# Patient Record
Sex: FEMALE | Race: WHITE | NOT HISPANIC OR LATINO | Employment: OTHER | URBAN - METROPOLITAN AREA
[De-identification: names, ages, dates, MRNs, and addresses within clinical notes are randomized per-mention and may not be internally consistent; named-entity substitution may affect disease eponyms.]

---

## 2017-03-28 ENCOUNTER — HOSPITAL ENCOUNTER (OUTPATIENT)
Dept: FAMILY MEDICINE CLINIC | Facility: CLINIC | Age: 62
Setting detail: SPECIMEN
Discharge: HOME | End: 2017-03-28
Attending: FAMILY MEDICINE | Admitting: FAMILY MEDICINE

## 2017-03-28 LAB
BACTERIA SPEC AEROBE CULT: NORMAL
Lab: NORMAL
MICRO REPORT STATUS: NORMAL
SPECIMEN SOURCE: NORMAL

## 2017-09-20 ENCOUNTER — HOSPITAL ENCOUNTER (OUTPATIENT)
Dept: FAMILY MEDICINE CLINIC | Facility: CLINIC | Age: 62
Setting detail: SPECIMEN
Discharge: HOME | End: 2017-09-20
Attending: FAMILY MEDICINE | Admitting: FAMILY MEDICINE

## 2017-09-20 LAB
ALBUMIN SERPL-MCNC: 3.6 G/DL (ref 3.5–4.8)
ALBUMIN/GLOB SERPL: 1 {RATIO} (ref 1–1.7)
ALP SERPL-CCNC: 81 IU/L (ref 32–91)
ALT SERPL-CCNC: 14 IU/L (ref 14–54)
ANION GAP SERPL CALC-SCNC: 12.4 MMOL/L (ref 10–20)
AST SERPL-CCNC: 19 IU/L (ref 15–41)
BILIRUB SERPL-MCNC: 0.6 MG/DL (ref 0.3–1.2)
BUN SERPL-MCNC: 22 MG/DL (ref 8–20)
BUN/CREAT SERPL: 18.3 (ref 5.4–26.2)
CALCIUM SERPL-MCNC: 9.5 MG/DL (ref 8.9–10.3)
CHLORIDE SERPL-SCNC: 103 MMOL/L (ref 101–111)
CHOLEST SERPL-MCNC: 159 MG/DL
CHOLEST/HDLC SERPL: 4 {RATIO}
CONV CO2: 27 MMOL/L (ref 22–32)
CONV LDL CHOLESTEROL DIRECT: 87 MG/DL (ref 0–100)
CONV MICROALBUM.,U,RANDOM: 19 MG/L
CONV TOTAL PROTEIN: 7.3 G/DL (ref 6.1–7.9)
CREAT UR-MCNC: 1.2 MG/DL (ref 0.4–1)
GLOBULIN UR ELPH-MCNC: 3.7 G/DL (ref 2.5–3.8)
GLUCOSE SERPL-MCNC: 154 MG/DL (ref 65–99)
HDLC SERPL-MCNC: 39 MG/DL
LDLC/HDLC SERPL: 2.2 {RATIO}
LIPID INTERPRETATION: ABNORMAL
POTASSIUM SERPL-SCNC: 5.4 MMOL/L (ref 3.6–5.1)
SODIUM SERPL-SCNC: 137 MMOL/L (ref 136–144)
TRIGL SERPL-MCNC: 268 MG/DL
VLDLC SERPL CALC-MCNC: 32.6 MG/DL

## 2017-10-09 ENCOUNTER — HOSPITAL ENCOUNTER (OUTPATIENT)
Dept: FAMILY MEDICINE CLINIC | Facility: CLINIC | Age: 62
Setting detail: SPECIMEN
Discharge: HOME | End: 2017-10-09
Attending: INTERNAL MEDICINE | Admitting: INTERNAL MEDICINE

## 2017-10-09 LAB
ALBUMIN SERPL-MCNC: 3.6 G/DL (ref 3.5–4.8)
ALBUMIN/GLOB SERPL: 1 {RATIO} (ref 1–1.7)
ALP SERPL-CCNC: 70 IU/L (ref 32–91)
ALT SERPL-CCNC: 16 IU/L (ref 14–54)
ANION GAP SERPL CALC-SCNC: 13.4 MMOL/L (ref 10–20)
AST SERPL-CCNC: 24 IU/L (ref 15–41)
BILIRUB SERPL-MCNC: 0.6 MG/DL (ref 0.3–1.2)
BUN SERPL-MCNC: 19 MG/DL (ref 8–20)
BUN/CREAT SERPL: 17.3 (ref 5.4–26.2)
CALCIUM SERPL-MCNC: 9.4 MG/DL (ref 8.9–10.3)
CHLORIDE SERPL-SCNC: 105 MMOL/L (ref 101–111)
CONV CO2: 24 MMOL/L (ref 22–32)
CONV TOTAL PROTEIN: 7.3 G/DL (ref 6.1–7.9)
CREAT UR-MCNC: 1.1 MG/DL (ref 0.4–1)
GLOBULIN UR ELPH-MCNC: 3.7 G/DL (ref 2.5–3.8)
GLUCOSE SERPL-MCNC: 227 MG/DL (ref 65–99)
POTASSIUM SERPL-SCNC: 4.4 MMOL/L (ref 3.6–5.1)
SODIUM SERPL-SCNC: 138 MMOL/L (ref 136–144)

## 2017-11-18 ENCOUNTER — HOSPITAL ENCOUNTER (OUTPATIENT)
Dept: URGENT CARE | Facility: CLINIC | Age: 62
Setting detail: SPECIMEN
Discharge: HOME | End: 2017-11-18
Attending: FAMILY MEDICINE | Admitting: FAMILY MEDICINE

## 2017-11-18 LAB
BACTERIA ISLT: NORMAL
BACTERIA SPEC AEROBE CULT: NORMAL
CLINDAMYCIN SUSC ISLT: NORMAL
ERYTHROMYCIN SUSC ISLT: NORMAL
GRAM STN SPEC: NORMAL
Lab: NORMAL
MICRO REPORT STATUS: NORMAL
OXACILLIN SUSC ISLT: NORMAL
SPECIMEN SOURCE: NORMAL
SUSC METH SPEC: NORMAL
TETRACYCLINE SUSC ISLT: NORMAL
TRIMETHOPRIM/SULFA: NORMAL
VANCOMYCIN SUSC ISLT: NORMAL

## 2019-02-13 ENCOUNTER — HOSPITAL ENCOUNTER (OUTPATIENT)
Dept: FAMILY MEDICINE CLINIC | Facility: CLINIC | Age: 64
Setting detail: SPECIMEN
Discharge: HOME | End: 2019-02-13
Attending: FAMILY MEDICINE | Admitting: FAMILY MEDICINE

## 2019-02-13 LAB
ALBUMIN SERPL-MCNC: 3.7 G/DL (ref 3.5–4.8)
ALBUMIN/GLOB SERPL: 0.9 {RATIO} (ref 1–1.7)
ALP SERPL-CCNC: 79 IU/L (ref 32–91)
ALT SERPL-CCNC: 17 IU/L (ref 14–54)
ANION GAP SERPL CALC-SCNC: 16.8 MMOL/L (ref 10–20)
AST SERPL-CCNC: 24 IU/L (ref 15–41)
BASOPHILS # BLD AUTO: 0.1 10*3/UL (ref 0–0.2)
BASOPHILS NFR BLD AUTO: 1 % (ref 0–2)
BILIRUB SERPL-MCNC: 0.6 MG/DL (ref 0.3–1.2)
BUN SERPL-MCNC: 16 MG/DL (ref 8–20)
BUN/CREAT SERPL: 16 (ref 5.4–26.2)
CALCIUM SERPL-MCNC: 9.7 MG/DL (ref 8.9–10.3)
CHLORIDE SERPL-SCNC: 101 MMOL/L (ref 101–111)
CHOLEST SERPL-MCNC: 156 MG/DL
CHOLEST/HDLC SERPL: 3.6 {RATIO}
CONV CO2: 24 MMOL/L (ref 22–32)
CONV LDL CHOLESTEROL DIRECT: 91 MG/DL (ref 0–100)
CONV TOTAL PROTEIN: 7.9 G/DL (ref 6.1–7.9)
CREAT UR-MCNC: 1 MG/DL (ref 0.4–1)
DIFFERENTIAL METHOD BLD: (no result)
EOSINOPHIL # BLD AUTO: 0.2 10*3/UL (ref 0–0.3)
EOSINOPHIL # BLD AUTO: 2 % (ref 0–3)
ERYTHROCYTE [DISTWIDTH] IN BLOOD BY AUTOMATED COUNT: 14.8 % (ref 11.5–14.5)
GLOBULIN UR ELPH-MCNC: 4.2 G/DL (ref 2.5–3.8)
GLUCOSE SERPL-MCNC: 126 MG/DL (ref 65–99)
HCT VFR BLD AUTO: 39.8 % (ref 35–49)
HDLC SERPL-MCNC: 44 MG/DL
HGB BLD-MCNC: 13.1 G/DL (ref 12–15)
LDLC/HDLC SERPL: 2.1 {RATIO}
LIPID INTERPRETATION: ABNORMAL
LYMPHOCYTES # BLD AUTO: 3.9 10*3/UL (ref 0.8–4.8)
LYMPHOCYTES NFR BLD AUTO: 33 % (ref 18–42)
MCH RBC QN AUTO: 28.7 PG (ref 26–32)
MCHC RBC AUTO-ENTMCNC: 32.9 G/DL (ref 32–36)
MCV RBC AUTO: 87.2 FL (ref 80–94)
MONOCYTES # BLD AUTO: 0.8 10*3/UL (ref 0.1–1.3)
MONOCYTES NFR BLD AUTO: 7 % (ref 2–11)
NEUTROPHILS # BLD AUTO: 6.7 10*3/UL (ref 2.3–8.6)
NEUTROPHILS NFR BLD AUTO: 57 % (ref 50–75)
NRBC BLD AUTO-RTO: 0 /100{WBCS}
NRBC/RBC NFR BLD MANUAL: 0 10*3/UL
PLATELET # BLD AUTO: 399 10*3/UL (ref 150–450)
PMV BLD AUTO: 8.1 FL (ref 7.4–10.4)
POTASSIUM SERPL-SCNC: 4.8 MMOL/L (ref 3.6–5.1)
RBC # BLD AUTO: 4.57 10*6/UL (ref 4–5.4)
SODIUM SERPL-SCNC: 137 MMOL/L (ref 136–144)
TRIGL SERPL-MCNC: 242 MG/DL
VLDLC SERPL CALC-MCNC: 21.8 MG/DL
WBC # BLD AUTO: 11.8 10*3/UL (ref 4.5–11.5)

## 2019-02-21 ENCOUNTER — HOSPITAL ENCOUNTER (OUTPATIENT)
Dept: FAMILY MEDICINE CLINIC | Facility: CLINIC | Age: 64
Setting detail: SPECIMEN
Discharge: HOME | End: 2019-02-21
Attending: FAMILY MEDICINE | Admitting: FAMILY MEDICINE

## 2019-09-16 ENCOUNTER — OFFICE VISIT (OUTPATIENT)
Dept: FAMILY MEDICINE CLINIC | Facility: CLINIC | Age: 64
End: 2019-09-16

## 2019-09-16 VITALS
BODY MASS INDEX: 33.49 KG/M2 | HEART RATE: 81 BPM | HEIGHT: 63 IN | SYSTOLIC BLOOD PRESSURE: 125 MMHG | DIASTOLIC BLOOD PRESSURE: 75 MMHG | TEMPERATURE: 98.6 F | WEIGHT: 189 LBS | OXYGEN SATURATION: 96 %

## 2019-09-16 DIAGNOSIS — R30.0 DYSURIA: Primary | ICD-10-CM

## 2019-09-16 DIAGNOSIS — R35.0 FREQUENCY OF URINATION: ICD-10-CM

## 2019-09-16 PROBLEM — E78.5 HYPERLIPIDEMIA: Status: ACTIVE | Noted: 2019-09-16

## 2019-09-16 PROBLEM — K21.9 GASTROESOPHAGEAL REFLUX DISEASE: Status: ACTIVE | Noted: 2019-09-16

## 2019-09-16 PROBLEM — I25.10 CORONARY ARTERY DISEASE: Status: ACTIVE | Noted: 2019-09-16

## 2019-09-16 PROBLEM — I10 HYPERTENSION: Status: ACTIVE | Noted: 2019-09-16

## 2019-09-16 PROBLEM — E87.5 HYPERKALEMIA: Status: ACTIVE | Noted: 2017-09-22

## 2019-09-16 PROBLEM — Z80.0 FAMILY HISTORY OF MALIGNANT NEOPLASM OF COLON: Status: ACTIVE | Noted: 2019-09-16

## 2019-09-16 LAB
BILIRUB BLD-MCNC: NEGATIVE MG/DL
CLARITY, POC: CLEAR
COLOR UR: YELLOW
GLUCOSE UR STRIP-MCNC: NEGATIVE MG/DL
KETONES UR QL: NEGATIVE
LEUKOCYTE EST, POC: NEGATIVE
NITRITE UR-MCNC: NEGATIVE MG/ML
PH UR: 6.5 [PH] (ref 5–8)
PROT UR STRIP-MCNC: ABNORMAL MG/DL
RBC # UR STRIP: NEGATIVE /UL
SP GR UR: 1.02 (ref 1–1.03)
UROBILINOGEN UR QL: NORMAL

## 2019-09-16 PROCEDURE — 99213 OFFICE O/P EST LOW 20 MIN: CPT | Performed by: FAMILY MEDICINE

## 2019-09-16 PROCEDURE — 81003 URINALYSIS AUTO W/O SCOPE: CPT | Performed by: FAMILY MEDICINE

## 2019-09-16 RX ORDER — CLOBETASOL PROPIONATE 0.5 MG/G
OINTMENT TOPICAL
Refills: 3 | COMMUNITY
Start: 2019-08-01 | End: 2021-11-09

## 2019-09-16 RX ORDER — SULFAMETHOXAZOLE AND TRIMETHOPRIM 800; 160 MG/1; MG/1
1 TABLET ORAL 2 TIMES DAILY
Qty: 14 TABLET | Refills: 0 | Status: SHIPPED | OUTPATIENT
Start: 2019-09-16 | End: 2020-02-10

## 2019-09-16 NOTE — PATIENT INSTRUCTIONS
Urinary Frequency, Adult    Urinary frequency means urinating more often than usual. People with urinary frequency urinate at least 8 times in 24 hours, even if they drink a normal amount of fluid. Although they urinate more often than normal, the total amount of urine produced in a day may be normal. Urinary frequency is also called pollakiuria.  What are the causes?  This condition may be caused by:  · A urinary tract infection.  · Obesity.  · Bladder problems, such as bladder stones.  · Caffeine or alcohol.  · Eating food or drinking fluids that irritate the bladder. These include coffee, tea, soda, artificial sweeteners, citrus, tomato-based foods, and chocolate.  · Certain medicines, such as medicines that help the body get rid of extra fluid (diuretics).  · Muscle or nerve weakness.  · Overactive bladder.  · Chronic diabetes.  · Interstitial cystitis.  · In men, problems with the prostate, such as an enlarged prostate.  · In women, pregnancy.  In some cases, the cause may not be known.  What increases the risk?  This condition is more likely to develop in:  · Women who have gone through menopause.  · Men with prostate problems.  · People with a disease or injury that affects the nerves or spinal cord.  · People who have or have had a condition that affects the brain, such as a stroke.  What are the signs or symptoms?  Symptoms of this condition include:  · Feeling an urgent need to urinate often. The stress and anxiety of needing to find a bathroom quickly can make this urge worse.  · Urinating 8 or more times in 24 hours.  · Urinating as often as every 1 to 2 hours.  How is this diagnosed?  This condition is diagnosed based on your symptoms, your medical history, and a physical exam. You may have tests, such as:  · Blood tests.  · Urine tests.  · Imaging tests, such as X-rays or ultrasounds.  · A bladder test.  · A test of your neurological system. This is the body system that senses the need to urinate.  · A  test to check for problems in the urethra and bladder called cystoscopy.  You may also be asked to keep a bladder diary. A bladder diary is a record of what you eat and drink, how often you urinate, and how much you urinate. You may need to see a health care provider who specializes in conditions of the urinary tract (urologist) or kidneys (nephrologist).  How is this treated?  Treatment for this condition depends on the cause. Sometimes the condition goes away on its own and treatment is not necessary. If treatment is needed, it may include:  · Taking medicine.  · Learning exercises that strengthen the muscles that help control urination.  · Following a bladder training program. This may include:  ? Learning to delay going to the bathroom.  ? Double urinating (voiding). This helps if you are not completely emptying your bladder.  ? Scheduled voiding.  · Making diet changes, such as:  ? Avoiding caffeine.  ? Drinking fewer fluids, especially alcohol.  ? Not drinking in the evening.  ? Not having foods or drinks that may irritate the bladder.  ? Eating foods that help prevent or ease constipation. Constipation can make this condition worse.  · Having the nerves in your bladder stimulated. There are two options for stimulating the nerves to your bladder:  ? Outpatient electrical nerve stimulation. This is done by your health care provider.  ? Surgery to implant a bladder pacemaker. The pacemaker helps to control the urge to urinate.  Follow these instructions at home:  · Keep a bladder diary if told to by your health care provider.  · Take over-the-counter and prescription medicines only as told by your health care provider.  · Do any exercises as told by your health care provider.  · Follow a bladder training program as told by your health care provider.  · Make any recommended diet changes.  · Keep all follow-up visits as told by your health care provider. This is important.  Contact a health care provider  if:  · You start urinating more often.  · You feel pain or irritation when you urinate.  · You notice blood in your urine.  · Your urine looks cloudy.  · You develop a fever.  · You begin vomiting.  Get help right away if:  · You are unable to urinate.  This information is not intended to replace advice given to you by your health care provider. Make sure you discuss any questions you have with your health care provider.  Document Released: 10/14/2010 Document Revised: 01/18/2017 Document Reviewed: 07/13/2016  [employer] Interactive Patient Education © 2019 [employer]

## 2019-09-16 NOTE — PROGRESS NOTES
Subjective   Chief Complaint   Patient presents with   • Urinary Urgency   • Urinary Frequency   • Dysuria     Tania Blunt is a 64 y.o. female.     Urinary Frequency    This is a new problem. The current episode started yesterday. The problem occurs every urination. The problem has been gradually worsening. The pain is moderate. There has been no fever. Associated symptoms include frequency and hesitancy. Pertinent negatives include no chills, hematuria, nausea or vomiting. She has tried increased fluids for the symptoms. The treatment provided mild relief.   Dysuria    Associated symptoms include frequency and hesitancy. Pertinent negatives include no chills, hematuria, nausea or vomiting.      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   • TUBAL ABDOMINAL LIGATION Bilateral      Allergies no known allergies  Social History     Socioeconomic History   • Marital status:      Spouse name: Not on file   • Number of children: Not on file   • Years of education: Not on file   • Highest education level: Not on file   Tobacco Use   • Smoking status: Current Every Day Smoker     Packs/day: 1.00     Years: 50.00     Pack years: 50.00     Types: Cigarettes   • Smokeless tobacco: Never Used   Substance and Sexual Activity   • Alcohol use: No     Frequency: Never     Comment: drinks caffeine free sodas   • Sexual activity: Defer     Social History     Tobacco Use   Smoking Status Current Every Day Smoker   • Packs/day: 1.00   • Years: 50.00   • Pack years: 50.00   • Types: Cigarettes   Smokeless Tobacco Never Used       family history includes Colon cancer in her  mother; Heart disease in her maternal grandmother; Hypertension in her mother; Lung cancer in her father.  Current Outpatient Medications on File Prior to Visit   Medication Sig Dispense Refill   • clobetasol (TEMOVATE) 0.05 % ointment APPLY TO HANDS AND FEET QD  3   • aspirin 81 MG tablet Take 1 tablet by mouth 2 (Two) Times a Day.     • Blood Glucose Monitoring Suppl (ONE TOUCH ULTRA 2) w/Device kit ONETOUCH ULTRA 2 w/Device KIT     • dexlansoprazole (DEXILANT) 60 MG capsule Take 1 capsule by mouth Daily.     • gabapentin (NEURONTIN) 400 MG capsule Take 1 capsule by mouth 3 (Three) Times a Day.     • glucose blood (ONE TOUCH ULTRA TEST) test strip ONETOUCH ULTRA BLUE STRP     • glyBURIDE (DIAbeta) 5 MG tablet Take 1 tablet by mouth 2 (Two) Times a Day.     • hydrALAZINE (APRESOLINE) 25 MG tablet Take 1 tablet by mouth Every 12 (Twelve) Hours.     • HYDROcodone-acetaminophen (NORCO) 7.5-325 MG per tablet Take 1 tablet by mouth Every 6 (Six) Hours As Needed.     • isosorbide mononitrate (IMDUR) 60 MG 24 hr tablet Take 1 tablet by mouth Daily.     • Lancets Thin misc LANCETS THIN     • metoprolol tartrate (LOPRESSOR) 25 MG tablet Take 1 tablet by mouth Every 12 (Twelve) Hours.     • simvastatin (ZOCOR) 40 MG tablet Take 1 tablet by mouth every night at bedtime.     • SITagliptin-metFORMIN HCl ER (JANUMET XR)  MG tablet Take 1 tablet by mouth 2 (Two) Times a Day.     • [DISCONTINUED] Semaglutide (OZEMPIC) 0.25 or 0.5 MG/DOSE solution pen-injector OZEMPIC 0.25 or 0.5 MG/DOSE SOPN       No current facility-administered medications on file prior to visit.      Patient Active Problem List   Diagnosis   • Dysuria   • Frequency of urination       The following portions of the patient's history were reviewed and updated as appropriate: allergies, current medications, past family history, past medical history, past social history, past surgical history and problem list.    Review of Systems   Constitutional: Negative  "for chills and fever.   HENT: Negative for sinus pressure and sore throat.    Eyes: Negative for blurred vision.   Respiratory: Negative for cough and shortness of breath.    Cardiovascular: Negative for chest pain and palpitations.   Gastrointestinal: Negative for abdominal pain, nausea and vomiting.   Endocrine: Negative for polyuria.   Genitourinary: Positive for dysuria, frequency and hesitancy. Negative for hematuria.   Skin: Negative for rash.   Neurological: Negative for dizziness and headache.   Hematological: Negative for adenopathy.   Psychiatric/Behavioral: Negative for depressed mood.       Objective   /75 (BP Location: Left arm, Patient Position: Sitting, Cuff Size: Adult)   Pulse 81   Temp 98.6 °F (37 °C) (Oral)   Ht 160.7 cm (63.25\")   Wt 85.7 kg (189 lb)   SpO2 96%   BMI 33.22 kg/m²   Physical Exam    Office Visit on 09/16/2019   Component Date Value Ref Range Status   • Color 09/16/2019 Yellow  Yellow, Straw, Dark Yellow, Cathy Final   • Clarity, UA 09/16/2019 Clear  Clear Final   • Specific Gravity  09/16/2019 1.025  1.005 - 1.030 Final   • pH, Urine 09/16/2019 6.5  5.0 - 8.0 Final   • Leukocytes 09/16/2019 Negative  Negative Final   • Nitrite, UA 09/16/2019 Negative  Negative Final   • Protein, POC 09/16/2019 30 mg/dL* Negative mg/dL Final   • Glucose, UA 09/16/2019 Negative  Negative, 1000 mg/dL (3+) mg/dL Final   • Ketones, UA 09/16/2019 Negative  Negative Final   • Urobilinogen, UA 09/16/2019 Normal  Normal Final   • Bilirubin 09/16/2019 Negative  Negative Final   • Blood, UA 09/16/2019 Negative  Negative Final           Assessment/Plan       Tania was seen today for urinary urgency, urinary frequency and dysuria.    Diagnoses and all orders for this visit:    Dysuria  -     POCT urinalysis dipstick, automated    Frequency of urination  -     POCT urinalysis dipstick, automated           "

## 2019-09-30 RX ORDER — GABAPENTIN 400 MG/1
CAPSULE ORAL
Qty: 270 CAPSULE | Refills: 1 | Status: SHIPPED | OUTPATIENT
Start: 2019-09-30 | End: 2020-03-27

## 2019-09-30 RX ORDER — DEXLANSOPRAZOLE 60 MG/1
CAPSULE, DELAYED RELEASE ORAL
Qty: 30 CAPSULE | Refills: 5 | Status: SHIPPED | OUTPATIENT
Start: 2019-09-30 | End: 2020-03-25

## 2019-10-04 RX ORDER — SITAGLIPTIN AND METFORMIN HYDROCHLORIDE 1000; 50 MG/1; MG/1
TABLET, FILM COATED, EXTENDED RELEASE ORAL
Qty: 180 TABLET | Refills: 3 | Status: SHIPPED | OUTPATIENT
Start: 2019-10-04 | End: 2020-10-22 | Stop reason: SDUPTHER

## 2019-10-14 ENCOUNTER — TELEPHONE (OUTPATIENT)
Dept: FAMILY MEDICINE CLINIC | Facility: CLINIC | Age: 64
End: 2019-10-14

## 2019-10-14 NOTE — TELEPHONE ENCOUNTER
Pt called asking for a 90 days rx of Glyburide 5 mg BID. Her last rx is from 8/2017 because she does not take them as prescribed.

## 2019-10-16 RX ORDER — GLYBURIDE 5 MG/1
5 TABLET ORAL 2 TIMES DAILY WITH MEALS
Qty: 180 TABLET | Refills: 3 | Status: SHIPPED | OUTPATIENT
Start: 2019-10-16 | End: 2019-10-17

## 2019-10-17 RX ORDER — GLIMEPIRIDE 2 MG/1
2 TABLET ORAL
Qty: 90 TABLET | Refills: 1 | Status: SHIPPED | OUTPATIENT
Start: 2019-10-17 | End: 2020-04-24

## 2019-10-21 RX ORDER — HYDRALAZINE HYDROCHLORIDE 25 MG/1
TABLET, FILM COATED ORAL
Qty: 180 TABLET | Refills: 1 | Status: SHIPPED | OUTPATIENT
Start: 2019-10-21 | End: 2020-04-24

## 2019-10-28 ENCOUNTER — OFFICE VISIT (OUTPATIENT)
Dept: FAMILY MEDICINE CLINIC | Facility: CLINIC | Age: 64
End: 2019-10-28

## 2019-10-28 VITALS
TEMPERATURE: 98.1 F | HEART RATE: 83 BPM | RESPIRATION RATE: 16 BRPM | OXYGEN SATURATION: 95 % | SYSTOLIC BLOOD PRESSURE: 142 MMHG | WEIGHT: 186 LBS | HEIGHT: 63 IN | BODY MASS INDEX: 32.96 KG/M2 | DIASTOLIC BLOOD PRESSURE: 58 MMHG

## 2019-10-28 DIAGNOSIS — I10 ESSENTIAL HYPERTENSION: ICD-10-CM

## 2019-10-28 DIAGNOSIS — E11.9 TYPE 2 DIABETES MELLITUS WITHOUT COMPLICATION, WITHOUT LONG-TERM CURRENT USE OF INSULIN (HCC): ICD-10-CM

## 2019-10-28 DIAGNOSIS — L40.9 PSORIASIS: Primary | ICD-10-CM

## 2019-10-28 DIAGNOSIS — Z79.899 HIGH RISK MEDICATION USE: ICD-10-CM

## 2019-10-28 PROCEDURE — 99214 OFFICE O/P EST MOD 30 MIN: CPT | Performed by: FAMILY MEDICINE

## 2019-10-31 LAB
ALBUMIN SERPL-MCNC: 4.2 G/DL (ref 3.5–5.2)
ALBUMIN/GLOB SERPL: 1.2 G/DL
ALP SERPL-CCNC: 76 U/L (ref 39–117)
ALT SERPL W P-5'-P-CCNC: 11 U/L (ref 1–33)
ANION GAP SERPL CALCULATED.3IONS-SCNC: 12.4 MMOL/L (ref 5–15)
AST SERPL-CCNC: 16 U/L (ref 1–32)
BILIRUB SERPL-MCNC: 0.4 MG/DL (ref 0.2–1.2)
BUN BLD-MCNC: 13 MG/DL (ref 8–23)
BUN/CREAT SERPL: 15.9 (ref 7–25)
CALCIUM SPEC-SCNC: 9.4 MG/DL (ref 8.6–10.5)
CHLORIDE SERPL-SCNC: 97 MMOL/L (ref 98–107)
CHOLEST SERPL-MCNC: 136 MG/DL (ref 0–200)
CO2 SERPL-SCNC: 26.6 MMOL/L (ref 22–29)
CREAT BLD-MCNC: 0.82 MG/DL (ref 0.57–1)
GFR SERPL CREATININE-BSD FRML MDRD: 70 ML/MIN/1.73
GLOBULIN UR ELPH-MCNC: 3.6 GM/DL
GLUCOSE BLD-MCNC: 162 MG/DL (ref 65–99)
HBA1C MFR BLD: 6.9 % (ref 3.5–5.6)
HDLC SERPL-MCNC: 39 MG/DL (ref 40–60)
LDLC SERPL CALC-MCNC: 50 MG/DL (ref 0–100)
LDLC/HDLC SERPL: 1.28 {RATIO}
POTASSIUM BLD-SCNC: 4.5 MMOL/L (ref 3.5–5.2)
PROT SERPL-MCNC: 7.8 G/DL (ref 6–8.5)
SODIUM BLD-SCNC: 136 MMOL/L (ref 136–145)
TRIGL SERPL-MCNC: 236 MG/DL (ref 0–150)
VLDLC SERPL-MCNC: 47.2 MG/DL (ref 5–40)

## 2019-10-31 PROCEDURE — 80061 LIPID PANEL: CPT | Performed by: FAMILY MEDICINE

## 2019-10-31 PROCEDURE — 83036 HEMOGLOBIN GLYCOSYLATED A1C: CPT | Performed by: FAMILY MEDICINE

## 2019-10-31 PROCEDURE — 80053 COMPREHEN METABOLIC PANEL: CPT | Performed by: FAMILY MEDICINE

## 2019-10-31 PROCEDURE — 36415 COLL VENOUS BLD VENIPUNCTURE: CPT | Performed by: FAMILY MEDICINE

## 2020-02-10 ENCOUNTER — LAB (OUTPATIENT)
Dept: FAMILY MEDICINE CLINIC | Facility: CLINIC | Age: 65
End: 2020-02-10

## 2020-02-10 ENCOUNTER — OFFICE VISIT (OUTPATIENT)
Dept: FAMILY MEDICINE CLINIC | Facility: CLINIC | Age: 65
End: 2020-02-10

## 2020-02-10 VITALS
OXYGEN SATURATION: 95 % | WEIGHT: 181 LBS | DIASTOLIC BLOOD PRESSURE: 69 MMHG | BODY MASS INDEX: 31.81 KG/M2 | HEART RATE: 81 BPM | SYSTOLIC BLOOD PRESSURE: 147 MMHG | TEMPERATURE: 98.9 F

## 2020-02-10 DIAGNOSIS — Z12.11 SCREEN FOR COLON CANCER: ICD-10-CM

## 2020-02-10 DIAGNOSIS — M18.12 DEGENERATIVE ARTHRITIS OF THUMB, LEFT: Primary | ICD-10-CM

## 2020-02-10 DIAGNOSIS — Z12.31 ENCOUNTER FOR SCREENING MAMMOGRAM FOR BREAST CANCER: ICD-10-CM

## 2020-02-10 DIAGNOSIS — H10.31 ACUTE BACTERIAL CONJUNCTIVITIS OF RIGHT EYE: ICD-10-CM

## 2020-02-10 DIAGNOSIS — M18.12 DEGENERATIVE ARTHRITIS OF THUMB, LEFT: ICD-10-CM

## 2020-02-10 LAB — CHROMATIN AB SERPL-ACNC: <10 IU/ML (ref 0–14)

## 2020-02-10 PROCEDURE — 86038 ANTINUCLEAR ANTIBODIES: CPT | Performed by: FAMILY MEDICINE

## 2020-02-10 PROCEDURE — 36415 COLL VENOUS BLD VENIPUNCTURE: CPT

## 2020-02-10 PROCEDURE — 99214 OFFICE O/P EST MOD 30 MIN: CPT | Performed by: FAMILY MEDICINE

## 2020-02-10 PROCEDURE — 86431 RHEUMATOID FACTOR QUANT: CPT | Performed by: FAMILY MEDICINE

## 2020-02-10 RX ORDER — TOBRAMYCIN 3 MG/ML
1 SOLUTION/ DROPS OPHTHALMIC
Qty: 1 BOTTLE | Refills: 1 | Status: SHIPPED | OUTPATIENT
Start: 2020-02-10 | End: 2020-05-18

## 2020-02-10 RX ORDER — APREMILAST 30 MG/1
30 TABLET, FILM COATED ORAL DAILY
COMMUNITY
Start: 2020-01-22

## 2020-02-10 RX ORDER — HYDROCODONE BITARTRATE AND ACETAMINOPHEN 7.5; 325 MG/1; MG/1
1 TABLET ORAL EVERY 6 HOURS PRN
Qty: 28 TABLET | Refills: 0 | Status: SHIPPED | OUTPATIENT
Start: 2020-02-10 | End: 2020-05-18 | Stop reason: SDUPTHER

## 2020-02-10 RX ORDER — CALCIPOTRIENE 50 UG/G
OINTMENT TOPICAL
COMMUNITY
Start: 2019-12-30 | End: 2020-06-22 | Stop reason: ALTCHOICE

## 2020-02-10 NOTE — PROGRESS NOTES
Subjective   Chief Complaint   Patient presents with   • Hand Pain     Lt hand pain   • Eye Problem     itchy     Tania Blunt is a 64 y.o. female.     Hand Pain    The incident occurred more than 1 week ago. There was no injury mechanism. The pain is present in the left hand. The quality of the pain is described as aching. The pain does not radiate. The pain is moderate. Pertinent negatives include no chest pain. The symptoms are aggravated by movement. She has tried ice for the symptoms. The treatment provided mild relief.   Eye Problem    The right eye is affected. This is a new problem. The current episode started in the past 7 days. The problem occurs constantly. The problem has been unchanged. There was no injury mechanism. The pain is mild. There is no known exposure to pink eye. She does not wear contacts. Associated symptoms include an eye discharge and eye redness. Pertinent negatives include no blurred vision, fever or photophobia. She has tried nothing for the symptoms.      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   • TUBAL ABDOMINAL LIGATION Bilateral      No Known Allergies  Social History     Socioeconomic History   • Marital status:      Spouse name: Not on file   • Number of children: Not on file   • Years of education: Not on file   • Highest education level: Not on file   Tobacco Use   • Smoking status: Current Every Day Smoker     Packs/day: 1.00     Years: 50.00     Pack years: 50.00     Types: Cigarettes   • Smokeless tobacco: Never Used   Substance and Sexual Activity   • Alcohol use: No     Frequency: Never     Comment: drinks  caffeine free sodas   • Sexual activity: Defer     Social History     Tobacco Use   Smoking Status Current Every Day Smoker   • Packs/day: 1.00   • Years: 50.00   • Pack years: 50.00   • Types: Cigarettes   Smokeless Tobacco Never Used       family history includes Colon cancer in her mother; Heart disease in her maternal grandmother; Hypertension in her mother; Lung cancer in her father.  Current Outpatient Medications on File Prior to Visit   Medication Sig Dispense Refill   • calcipotriene (DOVONOX) 0.005 % ointment APPLY AA QAM UTD     • OTEZLA 30 MG tablet      • aspirin 81 MG tablet Take 1 tablet by mouth 2 (Two) Times a Day.     • Blood Glucose Monitoring Suppl (ONE TOUCH ULTRA 2) w/Device kit ONETOUCH ULTRA 2 w/Device KIT     • clobetasol (TEMOVATE) 0.05 % ointment APPLY TO HANDS AND FEET QD  3   • DEXILANT 60 MG capsule TAKE ONE CAPSULE BY MOUTH DAILY 30 capsule 5   • gabapentin (NEURONTIN) 400 MG capsule TAKE 1 CAPSULE BY MOUTH THREE TIMES DAILY FOR NERVE PAIN 270 capsule 1   • glimepiride (AMARYL) 2 MG tablet Take 1 tablet by mouth Every Morning Before Breakfast. 90 tablet 1   • glucose blood (ONE TOUCH ULTRA TEST) test strip ONETOUCH ULTRA BLUE STRP     • hydrALAZINE (APRESOLINE) 25 MG tablet TAKE 1 TABLET BY MOUTH TWICE DAILY 180 tablet 1   • isosorbide mononitrate (IMDUR) 60 MG 24 hr tablet Take 1 tablet by mouth Daily.     • JANUMET XR  MG tablet TAKE 1 TABLET BY MOUTH TWICE DAILY 180 tablet 3   • Lancets Thin misc LANCETS THIN     • metoprolol tartrate (LOPRESSOR) 25 MG tablet Take 1 tablet by mouth Every 12 (Twelve) Hours.     • mupirocin (BACTROBAN) 2 % ointment HAYLEY EXT AA BID PRN  2   • simvastatin (ZOCOR) 40 MG tablet Take 1 tablet by mouth every night at bedtime.     • [DISCONTINUED] Apremilast 10 & 20 & 30 MG tablet therapy pack Take 10 mg by mouth Daily.     • [DISCONTINUED] HYDROcodone-acetaminophen (NORCO) 7.5-325 MG per tablet Take 1 tablet by mouth Every 6 (Six) Hours As Needed.      • [DISCONTINUED] sulfamethoxazole-trimethoprim (BACTRIM DS) 800-160 MG per tablet Take 1 tablet by mouth 2 (Two) Times a Day. 14 tablet 0     No current facility-administered medications on file prior to visit.      Patient Active Problem List   Diagnosis   • Dysuria   • Frequency of urination   • Coronary artery disease   • Disorder of lung   • Encounter for general adult medical examination without abnormal findings   • Family history of malignant neoplasm of colon   • Gastroesophageal reflux disease   • Histoplasmosis   • Hyperkalemia   • Hyperlipidemia   • Hypertension   • Hypoxemia   • Psoriasis   • Sleep apnea   • Type 2 diabetes mellitus without complication (CMS/HCC)   • Degenerative arthritis of thumb, left   • Acute bacterial conjunctivitis of right eye   • Screen for colon cancer       The following portions of the patient's history were reviewed and updated as appropriate: allergies, current medications, past family history, past medical history, past social history, past surgical history and problem list.    Review of Systems   Constitutional: Negative for chills and fever.   HENT: Negative for sinus pressure and sore throat.    Eyes: Positive for discharge and redness. Negative for blurred vision and photophobia.   Respiratory: Negative for cough and shortness of breath.    Cardiovascular: Negative for chest pain and palpitations.   Gastrointestinal: Negative for abdominal pain.   Endocrine: Negative for polyuria.   Skin: Negative for rash.   Neurological: Negative for dizziness and headache.   Hematological: Negative for adenopathy.   Psychiatric/Behavioral: Negative for depressed mood.       Objective   /69 (BP Location: Left arm, Patient Position: Sitting, Cuff Size: Adult)   Pulse 81   Temp 98.9 °F (37.2 °C) (Oral)   Wt 82.1 kg (181 lb)   SpO2 95%   BMI 31.81 kg/m²   Physical Exam   Constitutional: She is oriented to person, place, and time. She appears well-developed. No distress.    HENT:   Head: Normocephalic.   Eyes: Lids are normal. Right conjunctiva is injected.   Neck: Trachea normal. No thyroid mass and no thyromegaly present.   Cardiovascular: Normal rate, regular rhythm and normal heart sounds.   Pulmonary/Chest: Effort normal and breath sounds normal.   Musculoskeletal:        Left hand: She exhibits decreased range of motion and tenderness. She exhibits no deformity and no laceration.        Hands:  Lymphadenopathy:     She has no cervical adenopathy.   Neurological: She is alert and oriented to person, place, and time.   Skin: Skin is warm and dry.   Psychiatric: She has a normal mood and affect. Her speech is normal and behavior is normal. She is attentive.       No visits with results within 1 Week(s) from this visit.   Latest known visit with results is:   Office Visit on 10/28/2019   Component Date Value Ref Range Status   • Glucose 10/31/2019 162* 65 - 99 mg/dL Final   • BUN 10/31/2019 13  8 - 23 mg/dL Final   • Creatinine 10/31/2019 0.82  0.57 - 1.00 mg/dL Final   • Sodium 10/31/2019 136  136 - 145 mmol/L Final   • Potassium 10/31/2019 4.5  3.5 - 5.2 mmol/L Final   • Chloride 10/31/2019 97* 98 - 107 mmol/L Final   • CO2 10/31/2019 26.6  22.0 - 29.0 mmol/L Final   • Calcium 10/31/2019 9.4  8.6 - 10.5 mg/dL Final   • Total Protein 10/31/2019 7.8  6.0 - 8.5 g/dL Final   • Albumin 10/31/2019 4.20  3.50 - 5.20 g/dL Final   • ALT (SGPT) 10/31/2019 11  1 - 33 U/L Final   • AST (SGOT) 10/31/2019 16  1 - 32 U/L Final   • Alkaline Phosphatase 10/31/2019 76  39 - 117 U/L Final   • Total Bilirubin 10/31/2019 0.4  0.2 - 1.2 mg/dL Final   • eGFR Non African Amer 10/31/2019 70  >60 mL/min/1.73 Final   • Globulin 10/31/2019 3.6  gm/dL Final   • A/G Ratio 10/31/2019 1.2  g/dL Final   • BUN/Creatinine Ratio 10/31/2019 15.9  7.0 - 25.0 Final   • Anion Gap 10/31/2019 12.4  5.0 - 15.0 mmol/L Final   • Hemoglobin A1C 10/31/2019 6.9* 3.5 - 5.6 % Final   • Total Cholesterol 10/31/2019 136  0 - 200  mg/dL Final   • Triglycerides 10/31/2019 236* 0 - 150 mg/dL Final   • HDL Cholesterol 10/31/2019 39* 40 - 60 mg/dL Final   • LDL Cholesterol  10/31/2019 50  0 - 100 mg/dL Final   • VLDL Cholesterol 10/31/2019 47.2* 5 - 40 mg/dL Final   • LDL/HDL Ratio 10/31/2019 1.28   Final           Assessment/Plan   Problems Addressed this Visit        Musculoskeletal and Integument    Degenerative arthritis of thumb, left - Primary    Relevant Medications    HYDROcodone-acetaminophen (NORCO) 7.5-325 MG per tablet    Other Relevant Orders    Ambulatory Referral to Hand Surgery    MARIANA    Rheumatoid Factor, Quant       Other    Acute bacterial conjunctivitis of right eye    Relevant Medications    tobramycin (TOBREX) 0.3 % solution ophthalmic solution    Screen for colon cancer    Relevant Orders    Ambulatory Referral For Screening Colonoscopy      Other Visit Diagnoses     Encounter for screening mammogram for breast cancer        Relevant Orders    Mammo Screening Digital Tomosynthesis Bilateral With CAD          Tania was seen today for hand pain and eye problem.    Diagnoses and all orders for this visit:    Degenerative arthritis of thumb, left  -     Ambulatory Referral to Hand Surgery  -     HYDROcodone-acetaminophen (NORCO) 7.5-325 MG per tablet; Take 1 tablet by mouth Every 6 (Six) Hours As Needed for Moderate Pain .  -     MARIANA; Future  -     Rheumatoid Factor, Quant; Future    Acute bacterial conjunctivitis of right eye    Screen for colon cancer  -     Ambulatory Referral For Screening Colonoscopy    Encounter for screening mammogram for breast cancer  -     Mammo Screening Digital Tomosynthesis Bilateral With CAD    Other orders  -     tobramycin (TOBREX) 0.3 % solution ophthalmic solution; Administer 1 drop to both eyes Every 4 (Four) Hours.

## 2020-02-10 NOTE — PATIENT INSTRUCTIONS
Bacterial Conjunctivitis, Adult  Bacterial conjunctivitis is an infection of your conjunctiva. This is the clear membrane that covers the white part of your eye and the inner part of your eyelid. This infection can make your eye:  · Red or pink.  · Itchy.  This condition spreads easily from person to person (is contagious) and from one eye to the other eye.  What are the causes?  · This condition is caused by germs (bacteria). You may get the infection if you come into close contact with:  ? A person who has the infection.  ? Items that have germs on them (are contaminated), such as face towels, contact lens solution, or eye makeup.  What increases the risk?  You are more likely to get this condition if you:  · Have contact with people who have the infection.  · Wear contact lenses.  · Have a sinus infection.  · Have had a recent eye injury or surgery.  · Have a weak body defense system (immune system).  · Have dry eyes.  What are the signs or symptoms?    · Thick, yellowish discharge from the eye.  · Tearing or watery eyes.  · Itchy eyes.  · Burning feeling in your eyes.  · Eye redness.  · Swollen eyelids.  · Blurred vision.  How is this treated?    · Antibiotic eye drops or ointment.  · Antibiotic medicine taken by mouth. This is used for infections that do not get better with drops or ointment or that last more than 10 days.  · Cool, wet cloths placed on the eyes.  · Artificial tears used 2-6 times a day.  Follow these instructions at home:  Medicines  · Take or apply your antibiotic medicine as told by your doctor. Do not stop taking or applying the antibiotic even if you start to feel better.  · Take or apply over-the-counter and prescription medicines only as told by your doctor.  · Do not touch your eyelid with the eye-drop bottle or the ointment tube.  Managing discomfort  · Wipe any fluid from your eye with a warm, wet washcloth or a cotton ball.  · Place a clean, cool, wet cloth on your eye. Do this for  10-20 minutes, 3-4 times per day.  General instructions  · Do not wear contacts until the infection is gone. Wear glasses until your doctor says it is okay to wear contacts again.  · Do not wear eye makeup until the infection is gone. Throw away old eye makeup.  · Change or wash your pillowcase every day.  · Do not share towels or washcloths.  · Wash your hands often with soap and water. Use paper towels to dry your hands.  · Do not touch or rub your eyes.  · Do not drive or use heavy machinery if your vision is blurred.  Contact a doctor if:  · You have a fever.  · You do not get better after 10 days.  Get help right away if:  · You have a fever and your symptoms get worse all of a sudden.  · You have very bad pain when you move your eye.  · Your face:  ? Hurts.  ? Is red.  ? Is swollen.  · You have sudden loss of vision.  Summary  · Bacterial conjunctivitis is an infection of your conjunctiva.  · This infection spreads easily from person to person.  · Wash your hands often with soap and water. Use paper towels to dry your hands.  · Take or apply your antibiotic medicine as told by your doctor.  · Contact a doctor if you have a fever or you do not get better after 10 days.  This information is not intended to replace advice given to you by your health care provider. Make sure you discuss any questions you have with your health care provider.  Document Released: 09/26/2009 Document Revised: 07/24/2019 Document Reviewed: 07/24/2019  [employer] Interactive Patient Education © 2019 [employer]

## 2020-02-12 ENCOUNTER — HOSPITAL ENCOUNTER (OUTPATIENT)
Dept: MAMMOGRAPHY | Facility: HOSPITAL | Age: 65
Discharge: HOME | End: 2020-02-12
Admitting: FAMILY MEDICINE

## 2020-02-12 LAB — ANA SER QL: NEGATIVE

## 2020-02-12 PROCEDURE — 77067 SCR MAMMO BI INCL CAD: CPT

## 2020-02-12 PROCEDURE — 77063 BREAST TOMOSYNTHESIS BI: CPT

## 2020-03-25 RX ORDER — DEXLANSOPRAZOLE 60 MG/1
CAPSULE, DELAYED RELEASE ORAL
Qty: 30 CAPSULE | Refills: 5 | Status: SHIPPED | OUTPATIENT
Start: 2020-03-25 | End: 2020-09-15

## 2020-03-27 RX ORDER — GABAPENTIN 400 MG/1
CAPSULE ORAL
Qty: 270 CAPSULE | Refills: 1 | Status: SHIPPED | OUTPATIENT
Start: 2020-03-27 | End: 2020-12-29

## 2020-04-24 RX ORDER — GLIMEPIRIDE 2 MG/1
2 TABLET ORAL
Qty: 90 TABLET | Refills: 1 | Status: SHIPPED | OUTPATIENT
Start: 2020-04-24 | End: 2020-09-18 | Stop reason: SDUPTHER

## 2020-04-24 RX ORDER — HYDRALAZINE HYDROCHLORIDE 25 MG/1
TABLET, FILM COATED ORAL
Qty: 180 TABLET | Refills: 1 | Status: SHIPPED | OUTPATIENT
Start: 2020-04-24 | End: 2020-10-21

## 2020-04-24 RX ORDER — SIMVASTATIN 40 MG
TABLET ORAL
Qty: 90 TABLET | Refills: 1 | Status: SHIPPED | OUTPATIENT
Start: 2020-04-24 | End: 2020-10-23

## 2020-05-07 ENCOUNTER — OFFICE VISIT (OUTPATIENT)
Dept: FAMILY MEDICINE CLINIC | Facility: CLINIC | Age: 65
End: 2020-05-07

## 2020-05-07 VITALS
OXYGEN SATURATION: 95 % | BODY MASS INDEX: 31.71 KG/M2 | TEMPERATURE: 97.8 F | HEIGHT: 63 IN | HEART RATE: 95 BPM | DIASTOLIC BLOOD PRESSURE: 71 MMHG | RESPIRATION RATE: 16 BRPM | SYSTOLIC BLOOD PRESSURE: 133 MMHG | WEIGHT: 179 LBS

## 2020-05-07 DIAGNOSIS — R30.0 DYSURIA: Primary | ICD-10-CM

## 2020-05-07 LAB
BILIRUB BLD-MCNC: NEGATIVE MG/DL
CLARITY, POC: ABNORMAL
COLOR UR: YELLOW
GLUCOSE UR STRIP-MCNC: NEGATIVE MG/DL
KETONES UR QL: NEGATIVE
LEUKOCYTE EST, POC: ABNORMAL
NITRITE UR-MCNC: POSITIVE MG/ML
PH UR: 6 [PH] (ref 5–8)
PROT UR STRIP-MCNC: ABNORMAL MG/DL
RBC # UR STRIP: NEGATIVE /UL
SP GR UR: 1.02 (ref 1–1.03)
UROBILINOGEN UR QL: NORMAL

## 2020-05-07 PROCEDURE — 99213 OFFICE O/P EST LOW 20 MIN: CPT | Performed by: FAMILY MEDICINE

## 2020-05-07 PROCEDURE — 81003 URINALYSIS AUTO W/O SCOPE: CPT | Performed by: FAMILY MEDICINE

## 2020-05-07 RX ORDER — PHENAZOPYRIDINE HYDROCHLORIDE 200 MG/1
200 TABLET, FILM COATED ORAL 3 TIMES DAILY PRN
Qty: 6 TABLET | Refills: 0 | Status: SHIPPED | OUTPATIENT
Start: 2020-05-07 | End: 2020-05-18

## 2020-05-07 RX ORDER — SULFAMETHOXAZOLE AND TRIMETHOPRIM 800; 160 MG/1; MG/1
1 TABLET ORAL 2 TIMES DAILY
Qty: 14 TABLET | Refills: 0 | Status: SHIPPED | OUTPATIENT
Start: 2020-05-07 | End: 2020-05-14

## 2020-05-07 NOTE — PROGRESS NOTES
Subjective   Chief Complaint   Patient presents with   • Urinary Tract Infection     Tania Blunt is a 64 y.o. female.     Patient Care Team:  Arti Aceves MD as PCP - General  Arti Aceves MD as PCP - Claims Attributed  Banet, Duane Edward, MD as Consulting Physician (Dermatology)    Patient is coming in today due to some urinary problems and right-sided lower back pain.  She reports that the pain has been present in the area for about 6 weeks.  It is somehow worse and over the last few days she is noted some urinary frequency and discomfort upon urination.  She was treated for urinary tract infections even in the past.  She denies any fever, nausea, vomiting, or diarrhea.  No blood in urine reported.  She did not want to come earlier due to COVID-19 situation.       The following portions of the patient's history were reviewed and updated as appropriate: allergies, current medications, past family history, past medical history, past social history, past surgical history and problem list.  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   • TUBAL ABDOMINAL LIGATION Bilateral      The patient has a family history of  Family History   Problem Relation Age of Onset   • Hypertension Mother    • Colon cancer Mother    • Lung cancer Father    • Heart disease Maternal Grandmother      Social History     Socioeconomic History   • Marital status:      Spouse name: Not on file   • Number of children: Not on file   • Years of education: Not on file   • Highest education level: Not on file   Tobacco Use   • Smoking status: Current Every Day Smoker     Packs/day:  "1.00     Years: 50.00     Pack years: 50.00     Types: Cigarettes   • Smokeless tobacco: Never Used   Substance and Sexual Activity   • Alcohol use: No     Frequency: Never     Comment: drinks caffeine free sodas   • Sexual activity: Defer       Review of Systems   Constitutional: Negative for chills, fatigue and fever.   Gastrointestinal: Negative for abdominal pain, diarrhea, nausea and vomiting.   Genitourinary: Positive for dysuria and frequency. Negative for flank pain, hematuria, pelvic pain, urgency, vaginal discharge and vaginal pain.     Visit Vitals  /71 (BP Location: Left arm, Patient Position: Sitting, Cuff Size: Large Adult)   Pulse 95   Temp 97.8 °F (36.6 °C) (Temporal)   Resp 16   Ht 160.7 cm (63.25\")   Wt 81.2 kg (179 lb)   SpO2 95%   BMI 31.46 kg/m²       Current Outpatient Medications:   •  aspirin 81 MG tablet, Take 1 tablet by mouth 2 (Two) Times a Day., Disp: , Rfl:   •  Blood Glucose Monitoring Suppl (ONE TOUCH ULTRA 2) w/Device kit, ONETOUCH ULTRA 2 w/Device KIT, Disp: , Rfl:   •  calcipotriene (DOVONOX) 0.005 % ointment, APPLY AA QAM UTD, Disp: , Rfl:   •  clobetasol (TEMOVATE) 0.05 % ointment, APPLY TO HANDS AND FEET QD, Disp: , Rfl: 3  •  DEXILANT 60 MG capsule, TAKE ONE CAPSULE BY MOUTH DAILY, Disp: 30 capsule, Rfl: 5  •  gabapentin (NEURONTIN) 400 MG capsule, TAKE 1 CAPSULE BY MOUTH THREE TIMES DAILY FOR NERVE PAIN, Disp: 270 capsule, Rfl: 1  •  glimepiride (AMARYL) 2 MG tablet, TAKE 1 TABLET BY MOUTH EVERY MORNING BEFORE BREAKFAST, Disp: 90 tablet, Rfl: 1  •  glucose blood test strip, TEST BLOOD SUGAR TWICE DAILY DUE TO HIGH VARIABLE BLOOD SUGAR, Disp: 200 each, Rfl: 5  •  hydrALAZINE (APRESOLINE) 25 MG tablet, TAKE 1 TABLET BY MOUTH TWICE DAILY, Disp: 180 tablet, Rfl: 1  •  HYDROcodone-acetaminophen (NORCO) 7.5-325 MG per tablet, Take 1 tablet by mouth Every 6 (Six) Hours As Needed for Moderate Pain ., Disp: 28 tablet, Rfl: 0  •  isosorbide mononitrate (IMDUR) 60 MG 24 hr tablet, " Take 1 tablet by mouth Daily., Disp: , Rfl:   •  JANUMET XR  MG tablet, TAKE 1 TABLET BY MOUTH TWICE DAILY, Disp: 180 tablet, Rfl: 3  •  Lancets Thin misc, LANCETS THIN, Disp: , Rfl:   •  metoprolol tartrate (LOPRESSOR) 25 MG tablet, TAKE 1 TABLET BY MOUTH TWICE DAILY, Disp: 180 tablet, Rfl: 1  •  mupirocin (BACTROBAN) 2 % ointment, HAYLEY EXT AA BID PRN, Disp: , Rfl: 2  •  OTEZLA 30 MG tablet, , Disp: , Rfl:   •  phenazopyridine (Pyridium) 200 MG tablet, Take 1 tablet by mouth 3 (Three) Times a Day As Needed for Bladder Spasms., Disp: 6 tablet, Rfl: 0  •  simvastatin (ZOCOR) 40 MG tablet, TAKE 1 TABLET BY MOUTH EVERY EVENING FOR HIGH CHOLESTEROL, Disp: 90 tablet, Rfl: 1  •  sulfamethoxazole-trimethoprim (Bactrim DS) 800-160 MG per tablet, Take 1 tablet by mouth 2 (Two) Times a Day for 7 days., Disp: 14 tablet, Rfl: 0  •  tobramycin (TOBREX) 0.3 % solution ophthalmic solution, Administer 1 drop to both eyes Every 4 (Four) Hours., Disp: 1 bottle, Rfl: 1    Objective   Physical Exam   Constitutional: She is oriented to person, place, and time. She appears well-developed and well-nourished.   HENT:   Head: Normocephalic and atraumatic.   Eyes: Pupils are equal, round, and reactive to light. Conjunctivae and EOM are normal.   Neck: Normal range of motion. Neck supple.   Cardiovascular: Normal rate, regular rhythm, normal heart sounds and intact distal pulses.   Pulmonary/Chest: Effort normal and breath sounds normal.   Abdominal: Soft. Bowel sounds are normal. She exhibits no distension. There is no tenderness. There is no guarding.   Musculoskeletal: Normal range of motion. She exhibits no edema or deformity.   Neurological: She is alert and oriented to person, place, and time.   Skin: Skin is warm and dry.   Nursing note and vitals reviewed.           Office Visit on 05/07/2020   Component Date Value Ref Range Status   • Color 05/07/2020 Yellow  Yellow, Straw, Dark Yellow, Cathy Final   • Clarity, UA 05/07/2020  Cloudy* Clear Final   • Specific Gravity  05/07/2020 1.020  1.005 - 1.030 Final   • pH, Urine 05/07/2020 6.0  5.0 - 8.0 Final   • Leukocytes 05/07/2020 Small (1+)* Negative Final   • Nitrite, UA 05/07/2020 Positive* Negative Final   • Protein, POC 05/07/2020 Trace* Negative mg/dL Final   • Glucose, UA 05/07/2020 Negative  Negative, 1000 mg/dL (3+) mg/dL Final   • Ketones, UA 05/07/2020 Negative  Negative Final   • Urobilinogen, UA 05/07/2020 Normal  Normal Final   • Bilirubin 05/07/2020 Negative  Negative Final   • Blood, UA 05/07/2020 Negative  Negative Final                 Assessment/Plan   Problems Addressed this Visit        Nervous and Auditory    Dysuria - Primary    Relevant Orders    POC Urinalysis Dipstick, Automated (Completed)    Urine Culture - Urine, Urine, Clean Catch        Patient was evaluated in person in the office today.  Her urine dip done today looks suspicious for UTI.  I will be starting her on antibiotic.  I also gave her prescription for some Pyridium to take as needed.  She was advised to keep up with fluid intake.  She will monitor her symptoms and call us back if not getting better or getting worse.             Requested Prescriptions     Signed Prescriptions Disp Refills   • sulfamethoxazole-trimethoprim (Bactrim DS) 800-160 MG per tablet 14 tablet 0     Sig: Take 1 tablet by mouth 2 (Two) Times a Day for 7 days.   • phenazopyridine (Pyridium) 200 MG tablet 6 tablet 0     Sig: Take 1 tablet by mouth 3 (Three) Times a Day As Needed for Bladder Spasms.

## 2020-05-08 PROCEDURE — 87088 URINE BACTERIA CULTURE: CPT | Performed by: FAMILY MEDICINE

## 2020-05-08 PROCEDURE — 87186 SC STD MICRODIL/AGAR DIL: CPT | Performed by: FAMILY MEDICINE

## 2020-05-08 PROCEDURE — 87086 URINE CULTURE/COLONY COUNT: CPT | Performed by: FAMILY MEDICINE

## 2020-05-10 LAB — BACTERIA SPEC AEROBE CULT: ABNORMAL

## 2020-05-10 RX ORDER — NITROFURANTOIN 25; 75 MG/1; MG/1
100 CAPSULE ORAL 2 TIMES DAILY
Qty: 14 CAPSULE | Refills: 0 | Status: SHIPPED | OUTPATIENT
Start: 2020-05-10 | End: 2020-05-18

## 2020-05-18 ENCOUNTER — OFFICE VISIT (OUTPATIENT)
Dept: FAMILY MEDICINE CLINIC | Facility: CLINIC | Age: 65
End: 2020-05-18

## 2020-05-18 VITALS
DIASTOLIC BLOOD PRESSURE: 70 MMHG | BODY MASS INDEX: 31.46 KG/M2 | TEMPERATURE: 97.7 F | HEART RATE: 92 BPM | WEIGHT: 179 LBS | SYSTOLIC BLOOD PRESSURE: 174 MMHG | OXYGEN SATURATION: 96 %

## 2020-05-18 DIAGNOSIS — I10 ESSENTIAL HYPERTENSION: ICD-10-CM

## 2020-05-18 DIAGNOSIS — R30.0 DYSURIA: Primary | ICD-10-CM

## 2020-05-18 DIAGNOSIS — M54.50 ACUTE RIGHT-SIDED LOW BACK PAIN WITHOUT SCIATICA: ICD-10-CM

## 2020-05-18 LAB
BILIRUB BLD-MCNC: NEGATIVE MG/DL
CLARITY, POC: CLEAR
COLOR UR: YELLOW
GLUCOSE UR STRIP-MCNC: ABNORMAL MG/DL
KETONES UR QL: NEGATIVE
LEUKOCYTE EST, POC: NEGATIVE
NITRITE UR-MCNC: NEGATIVE MG/ML
PH UR: 5.5 [PH] (ref 5–8)
PROT UR STRIP-MCNC: NEGATIVE MG/DL
RBC # UR STRIP: NEGATIVE /UL
SP GR UR: 1.02 (ref 1–1.03)
UROBILINOGEN UR QL: NORMAL

## 2020-05-18 PROCEDURE — 81003 URINALYSIS AUTO W/O SCOPE: CPT | Performed by: FAMILY MEDICINE

## 2020-05-18 PROCEDURE — 99214 OFFICE O/P EST MOD 30 MIN: CPT | Performed by: FAMILY MEDICINE

## 2020-05-18 RX ORDER — HYDROCODONE BITARTRATE AND ACETAMINOPHEN 7.5; 325 MG/1; MG/1
1 TABLET ORAL EVERY 6 HOURS PRN
Qty: 28 TABLET | Refills: 0 | Status: SHIPPED | OUTPATIENT
Start: 2020-05-18 | End: 2020-09-09 | Stop reason: SDUPTHER

## 2020-05-18 RX ORDER — BACLOFEN 10 MG/1
10 TABLET ORAL 3 TIMES DAILY
Qty: 30 TABLET | Refills: 0 | Status: SHIPPED | OUTPATIENT
Start: 2020-05-18 | End: 2020-10-09

## 2020-05-18 NOTE — PROGRESS NOTES
Subjective   Chief Complaint   Patient presents with   • Urinary Tract Infection     recheck, kidney pain     Tania Blunt is a 64 y.o. female.     Urinary Tract Infection    This is a recurrent problem. The current episode started 1 to 4 weeks ago. The problem occurs intermittently. The problem has been waxing and waning. The quality of the pain is described as aching. The pain is moderate. There has been no fever. Pertinent negatives include no chills, discharge, frequency, nausea or urgency. She has tried antibiotics for the symptoms. The treatment provided mild relief.   Back Pain   This is a recurrent problem. The current episode started 1 to 4 weeks ago. The problem occurs daily. The problem has been waxing and waning since onset. The pain is present in the lumbar spine. The quality of the pain is described as aching. The pain does not radiate. The pain is moderate. Associated symptoms include dysuria. Pertinent negatives include no abdominal pain, bladder incontinence, bowel incontinence, chest pain, fever or numbness. She has tried NSAIDs and analgesics for the symptoms. The treatment provided mild relief.   Hypertension   This is a chronic problem. The current episode started more than 1 year ago. The problem has been waxing and waning since onset. The problem is uncontrolled. Pertinent negatives include no anxiety, blurred vision, chest pain, palpitations, peripheral edema or shortness of breath. Current antihypertension treatment includes beta blockers and central alpha agonists. The current treatment provides moderate improvement. There is no history of angina.      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   • TUBAL ABDOMINAL LIGATION Bilateral      No Known Allergies  Social History     Socioeconomic History   • Marital status:      Spouse name: Not on file   • Number of children: Not on file   • Years of education: Not on file   • Highest education level: Not on file   Tobacco Use   • Smoking status: Current Every Day Smoker     Packs/day: 1.00     Years: 50.00     Pack years: 50.00     Types: Cigarettes   • Smokeless tobacco: Never Used   Substance and Sexual Activity   • Alcohol use: No     Frequency: Never     Comment: drinks caffeine free sodas   • Sexual activity: Defer     Social History     Tobacco Use   Smoking Status Current Every Day Smoker   • Packs/day: 1.00   • Years: 50.00   • Pack years: 50.00   • Types: Cigarettes   Smokeless Tobacco Never Used       family history includes Colon cancer in her mother; Heart disease in her maternal grandmother; Hypertension in her mother; Lung cancer in her father.  Current Outpatient Medications on File Prior to Visit   Medication Sig Dispense Refill   • aspirin 81 MG tablet Take 1 tablet by mouth 2 (Two) Times a Day.     • Blood Glucose Monitoring Suppl (ONE TOUCH ULTRA 2) w/Device kit ONETOUCH ULTRA 2 w/Device KIT     • calcipotriene (DOVONOX) 0.005 % ointment APPLY AA QAM UTD     • clobetasol (TEMOVATE) 0.05 % ointment APPLY TO HANDS AND FEET QD  3   • DEXILANT 60 MG capsule TAKE ONE CAPSULE BY MOUTH DAILY 30 capsule 5   • gabapentin (NEURONTIN) 400 MG capsule TAKE 1 CAPSULE BY MOUTH THREE TIMES DAILY FOR NERVE PAIN 270 capsule 1   • glimepiride (AMARYL) 2 MG tablet TAKE 1 TABLET BY MOUTH EVERY MORNING BEFORE BREAKFAST 90 tablet 1   • glucose blood test strip TEST BLOOD SUGAR TWICE DAILY DUE TO HIGH VARIABLE BLOOD SUGAR 200 each 5   • hydrALAZINE (APRESOLINE) 25 MG tablet TAKE 1 TABLET BY MOUTH TWICE DAILY 180 tablet 1   • isosorbide mononitrate (IMDUR) 60 MG 24 hr tablet Take 1 tablet by mouth  Daily.     • JANUMET XR  MG tablet TAKE 1 TABLET BY MOUTH TWICE DAILY 180 tablet 3   • Lancets Thin misc LANCETS THIN     • metoprolol tartrate (LOPRESSOR) 25 MG tablet TAKE 1 TABLET BY MOUTH TWICE DAILY 180 tablet 1   • mupirocin (BACTROBAN) 2 % ointment HAYLEY EXT AA BID PRN  2   • OTEZLA 30 MG tablet      • simvastatin (ZOCOR) 40 MG tablet TAKE 1 TABLET BY MOUTH EVERY EVENING FOR HIGH CHOLESTEROL 90 tablet 1   • [DISCONTINUED] HYDROcodone-acetaminophen (NORCO) 7.5-325 MG per tablet Take 1 tablet by mouth Every 6 (Six) Hours As Needed for Moderate Pain . 28 tablet 0   • [DISCONTINUED] nitrofurantoin, macrocrystal-monohydrate, (Macrobid) 100 MG capsule Take 1 capsule by mouth 2 (Two) Times a Day for 7 days. 14 capsule 0   • [DISCONTINUED] phenazopyridine (Pyridium) 200 MG tablet Take 1 tablet by mouth 3 (Three) Times a Day As Needed for Bladder Spasms. 6 tablet 0   • [DISCONTINUED] tobramycin (TOBREX) 0.3 % solution ophthalmic solution Administer 1 drop to both eyes Every 4 (Four) Hours. 1 bottle 1     No current facility-administered medications on file prior to visit.      Patient Active Problem List   Diagnosis   • Dysuria   • Frequency of urination   • Coronary artery disease   • Disorder of lung   • Encounter for general adult medical examination without abnormal findings   • Family history of malignant neoplasm of colon   • Gastroesophageal reflux disease   • Histoplasmosis   • Hyperkalemia   • Hyperlipidemia   • Hypertension   • Hypoxemia   • Psoriasis   • Sleep apnea   • Type 2 diabetes mellitus without complication (CMS/HCC)   • Degenerative arthritis of thumb, left   • Screen for colon cancer   • Tobacco dependence syndrome   • Acute right-sided low back pain without sciatica       The following portions of the patient's history were reviewed and updated as appropriate: allergies, current medications, past family history, past medical history, past social history, past surgical history and problem  list.    Review of Systems   Constitutional: Negative for chills and fever.   HENT: Negative for sinus pressure and sore throat.    Eyes: Negative for blurred vision.   Respiratory: Negative for cough and shortness of breath.    Cardiovascular: Negative for chest pain and palpitations.   Gastrointestinal: Negative for abdominal pain, bowel incontinence and nausea.   Endocrine: Negative for polyuria.   Genitourinary: Positive for dysuria. Negative for frequency, urgency and urinary incontinence.   Musculoskeletal: Positive for back pain.   Skin: Negative for rash.   Neurological: Negative for dizziness, numbness and headache.   Hematological: Negative for adenopathy.   Psychiatric/Behavioral: Negative for depressed mood.       Objective   /70 (BP Location: Left arm, Patient Position: Sitting, Cuff Size: Adult)   Pulse 92   Temp 97.7 °F (36.5 °C) Comment: contactless  Wt 81.2 kg (179 lb)   SpO2 96%   BMI 31.46 kg/m²   Physical Exam   Constitutional: She is oriented to person, place, and time. She appears well-developed. No distress.   HENT:   Head: Normocephalic.   Eyes: Conjunctivae and lids are normal.   Neck: Trachea normal. No thyroid mass and no thyromegaly present.   Cardiovascular: Normal rate, regular rhythm and normal heart sounds.   Pulmonary/Chest: Effort normal and breath sounds normal.   Musculoskeletal:        Lumbar back: She exhibits tenderness.   Lymphadenopathy:     She has no cervical adenopathy.   Neurological: She is alert and oriented to person, place, and time.   Skin: Skin is warm and dry.   Psychiatric: She has a normal mood and affect. Her speech is normal and behavior is normal. She is attentive.       No visits with results within 1 Week(s) from this visit.   Latest known visit with results is:   Office Visit on 05/07/2020   Component Date Value Ref Range Status   • Color 05/07/2020 Yellow  Yellow, Straw, Dark Yellow, Cathy Final   • Clarity, UA 05/07/2020 Cloudy* Clear Final   •  Specific Gravity  05/07/2020 1.020  1.005 - 1.030 Final   • pH, Urine 05/07/2020 6.0  5.0 - 8.0 Final   • Leukocytes 05/07/2020 Small (1+)* Negative Final   • Nitrite, UA 05/07/2020 Positive* Negative Final   • Protein, POC 05/07/2020 Trace* Negative mg/dL Final   • Glucose, UA 05/07/2020 Negative  Negative, 1000 mg/dL (3+) mg/dL Final   • Ketones, UA 05/07/2020 Negative  Negative Final   • Urobilinogen, UA 05/07/2020 Normal  Normal Final   • Bilirubin 05/07/2020 Negative  Negative Final   • Blood, UA 05/07/2020 Negative  Negative Final   • Urine Culture 05/08/2020 >100,000 CFU/mL Escherichia coli*  Final           Assessment/Plan   Problems Addressed this Visit        Cardiovascular and Mediastinum    Hypertension     Hypertension is worsening.  Continue current treatment regimen.  Dietary sodium restriction.  Weight loss.  Regular aerobic exercise.  Blood pressure will be reassessed in 3 months.  She just took her blood pressure medicines about an hour ago and is having worse pain today.             Nervous and Auditory    Dysuria - Primary     No sign of UTI today.  Increase fluid intake.          Relevant Orders    POCT urinalysis dipstick, automated    Acute right-sided low back pain without sciatica     Add muscle relaxer.          Relevant Medications    HYDROcodone-acetaminophen (Norco) 7.5-325 MG per tablet    baclofen (LIORESAL) 10 MG tablet

## 2020-05-18 NOTE — ASSESSMENT & PLAN NOTE
Hypertension is worsening.  Continue current treatment regimen.  Dietary sodium restriction.  Weight loss.  Regular aerobic exercise.  Blood pressure will be reassessed in 3 months.  She just took her blood pressure medicines about an hour ago and is having worse pain today.

## 2020-06-22 ENCOUNTER — OFFICE VISIT (OUTPATIENT)
Dept: FAMILY MEDICINE CLINIC | Facility: CLINIC | Age: 65
End: 2020-06-22

## 2020-06-22 VITALS
OXYGEN SATURATION: 95 % | HEART RATE: 88 BPM | SYSTOLIC BLOOD PRESSURE: 143 MMHG | BODY MASS INDEX: 30.93 KG/M2 | WEIGHT: 176 LBS | DIASTOLIC BLOOD PRESSURE: 66 MMHG | TEMPERATURE: 98.4 F

## 2020-06-22 DIAGNOSIS — R10.9 RIGHT FLANK PAIN: Primary | ICD-10-CM

## 2020-06-22 LAB
BILIRUB BLD-MCNC: ABNORMAL MG/DL
CLARITY, POC: CLEAR
COLOR UR: YELLOW
GLUCOSE UR STRIP-MCNC: ABNORMAL MG/DL
KETONES UR QL: ABNORMAL
LEUKOCYTE EST, POC: NEGATIVE
NITRITE UR-MCNC: NEGATIVE MG/ML
PH UR: 5 [PH] (ref 5–8)
PROT UR STRIP-MCNC: ABNORMAL MG/DL
RBC # UR STRIP: NEGATIVE /UL
SP GR UR: 1.03 (ref 1–1.03)
UROBILINOGEN UR QL: NORMAL

## 2020-06-22 PROCEDURE — 99214 OFFICE O/P EST MOD 30 MIN: CPT | Performed by: FAMILY MEDICINE

## 2020-06-22 PROCEDURE — 81003 URINALYSIS AUTO W/O SCOPE: CPT | Performed by: FAMILY MEDICINE

## 2020-06-22 RX ORDER — CALCIPOTRIENE 0.05 MG/ML
SOLUTION TOPICAL
COMMUNITY
Start: 2020-06-17 | End: 2021-11-09

## 2020-06-29 ENCOUNTER — HOSPITAL ENCOUNTER (OUTPATIENT)
Dept: CT IMAGING | Facility: HOSPITAL | Age: 65
Discharge: HOME | End: 2020-06-29
Admitting: FAMILY MEDICINE

## 2020-06-29 DIAGNOSIS — R10.9 RIGHT FLANK PAIN: ICD-10-CM

## 2020-06-29 PROCEDURE — 74176 CT ABD & PELVIS W/O CONTRAST: CPT

## 2020-07-03 DIAGNOSIS — M54.50 ACUTE RIGHT-SIDED LOW BACK PAIN WITHOUT SCIATICA: Primary | ICD-10-CM

## 2020-07-13 ENCOUNTER — TELEPHONE (OUTPATIENT)
Dept: CARDIOLOGY | Facility: CLINIC | Age: 65
End: 2020-07-13

## 2020-07-13 RX ORDER — ISOSORBIDE MONONITRATE 60 MG/1
60 TABLET, EXTENDED RELEASE ORAL EVERY 24 HOURS
Qty: 180 TABLET | Refills: 0 | Status: SHIPPED | OUTPATIENT
Start: 2020-07-13 | End: 2020-10-09 | Stop reason: SDUPTHER

## 2020-08-24 ENCOUNTER — OFFICE VISIT (OUTPATIENT)
Dept: PAIN MEDICINE | Facility: CLINIC | Age: 65
End: 2020-08-24

## 2020-08-24 VITALS
DIASTOLIC BLOOD PRESSURE: 74 MMHG | HEIGHT: 65 IN | OXYGEN SATURATION: 99 % | SYSTOLIC BLOOD PRESSURE: 174 MMHG | TEMPERATURE: 97.5 F | HEART RATE: 93 BPM | WEIGHT: 170 LBS | BODY MASS INDEX: 28.32 KG/M2 | RESPIRATION RATE: 16 BRPM

## 2020-08-24 DIAGNOSIS — M47.817 LUMBOSACRAL SPONDYLOSIS WITHOUT MYELOPATHY: Primary | ICD-10-CM

## 2020-08-24 PROCEDURE — 99203 OFFICE O/P NEW LOW 30 MIN: CPT | Performed by: STUDENT IN AN ORGANIZED HEALTH CARE EDUCATION/TRAINING PROGRAM

## 2020-08-24 PROCEDURE — G0463 HOSPITAL OUTPT CLINIC VISIT: HCPCS | Performed by: STUDENT IN AN ORGANIZED HEALTH CARE EDUCATION/TRAINING PROGRAM

## 2020-08-24 NOTE — PROGRESS NOTES
CHIEF COMPLAINT  Chronic low back pain    Subjective   History of Present Illness   Tania Blunt is a 65 y.o. female.   She presents to the office for evaluation of chronic low back pain. She was referred here by Arti Aceves MD  She states that she has had back pain off and on for many years.  She states that approximately February of this year she had severe back pain which is very debilitating to her.  She states that since that time, the back pain is significantly improved, and is now only minimal..     Location: Low back without radiation  Onset: Many years ago  Duration: Getting better  Timing: The pain is constant throughout the day  Quality: The pain is described as a dull aching sensation with occasional sharp, stabbing pain  Severity: Today: 2       Last Week: 4       Worst: 8  Modifying Factors: The pain is worse with physical activity and moving.  The pain is improved with medication as well as TENS unit and heating pads    Physical Therapy: She has not yet done physical therapy      Current Outpatient Medications:   •  aspirin 81 MG tablet, Take 1 tablet by mouth 2 (Two) Times a Day., Disp: , Rfl:   •  Blood Glucose Monitoring Suppl (ONE TOUCH ULTRA 2) w/Device kit, ONETOUCH ULTRA 2 w/Device KIT, Disp: , Rfl:   •  Calcipotriene 0.005 % ointment, HAYLEY 5 TO 10 GTS AA ON EACH HAND AND FOOT B APPLYING CLOBETASOL, Disp: , Rfl:   •  clobetasol (TEMOVATE) 0.05 % ointment, APPLY TO HANDS AND FEET QD, Disp: , Rfl: 3  •  DEXILANT 60 MG capsule, TAKE ONE CAPSULE BY MOUTH DAILY, Disp: 30 capsule, Rfl: 5  •  gabapentin (NEURONTIN) 400 MG capsule, TAKE 1 CAPSULE BY MOUTH THREE TIMES DAILY FOR NERVE PAIN, Disp: 270 capsule, Rfl: 1  •  glimepiride (AMARYL) 2 MG tablet, TAKE 1 TABLET BY MOUTH EVERY MORNING BEFORE BREAKFAST, Disp: 90 tablet, Rfl: 1  •  glucose blood test strip, TEST BLOOD SUGAR TWICE DAILY DUE TO HIGH VARIABLE BLOOD SUGAR, Disp: 200 each, Rfl: 5  •  hydrALAZINE (APRESOLINE) 25 MG tablet, TAKE 1  TABLET BY MOUTH TWICE DAILY, Disp: 180 tablet, Rfl: 1  •  HYDROcodone-acetaminophen (Norco) 7.5-325 MG per tablet, Take 1 tablet by mouth Every 6 (Six) Hours As Needed for Moderate Pain ., Disp: 28 tablet, Rfl: 0  •  isosorbide mononitrate (IMDUR) 60 MG 24 hr tablet, Take 1 tablet by mouth Daily., Disp: 180 tablet, Rfl: 0  •  JANUMET XR  MG tablet, TAKE 1 TABLET BY MOUTH TWICE DAILY, Disp: 180 tablet, Rfl: 3  •  Lancets Thin misc, LANCETS THIN, Disp: , Rfl:   •  metoprolol tartrate (LOPRESSOR) 25 MG tablet, TAKE 1 TABLET BY MOUTH TWICE DAILY, Disp: 180 tablet, Rfl: 1  •  mupirocin (BACTROBAN) 2 % ointment, HAYLEY EXT AA BID PRN, Disp: , Rfl: 2  •  OTEZLA 30 MG tablet, , Disp: , Rfl:   •  simvastatin (ZOCOR) 40 MG tablet, TAKE 1 TABLET BY MOUTH EVERY EVENING FOR HIGH CHOLESTEROL, Disp: 90 tablet, Rfl: 1  •  baclofen (LIORESAL) 10 MG tablet, Take 1 tablet by mouth 3 (Three) Times a Day., Disp: 30 tablet, Rfl: 0    The following portions of the patient's history were reviewed and updated as appropriate: allergies, current medications, past family history, past medical history, past social history, past surgical history and problem list.    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   • TUBAL ABDOMINAL LIGATION Bilateral      Family History   Problem Relation Age of Onset   • Hypertension Mother    • Colon cancer Mother    • Lung cancer Father    • Heart disease Maternal Grandmother      Social History     Socioeconomic  "History   • Marital status:      Spouse name: Not on file   • Number of children: Not on file   • Years of education: Not on file   • Highest education level: Not on file   Tobacco Use   • Smoking status: Current Every Day Smoker     Packs/day: 1.00     Years: 50.00     Pack years: 50.00     Types: Cigarettes   • Smokeless tobacco: Never Used   Substance and Sexual Activity   • Alcohol use: No     Frequency: Never     Comment: drinks caffeine free sodas   • Sexual activity: Defer     No Known Allergies        Review of Systems   Musculoskeletal: Positive for back pain, gait problem and joint swelling.   Neurological: Negative for weakness and numbness.   All other systems reviewed and are negative.      Objective   Vitals:    08/24/20 0831   BP: 174/74   Pulse: 93   Resp: 16   Temp: 97.5 °F (36.4 °C)   SpO2: 99%   Weight: 77.1 kg (170 lb)   Height: 165.1 cm (65\")   PainSc:   2     Physical Exam   Constitutional: She is oriented to person, place, and time. She appears well-developed and well-nourished. No distress.   HENT:   Head: Normocephalic and atraumatic.   Right Ear: Hearing normal.   Left Ear: Hearing normal.   Eyes: Pupils are equal, round, and reactive to light. Conjunctivae and EOM are normal.   Neck: Normal range of motion. Neck supple. No tracheal deviation present.   Cardiovascular:   Well-Perfused  No Edema   Pulmonary/Chest: Effort normal. No respiratory distress.   Stable on RA   Abdominal: Soft. There is no tenderness. There is no guarding.   Musculoskeletal:   Lumbar Spine Exam:  Tender to palpation over the lumbar paraspinal musculature Yes  Limited range of motion secondary to pain Yes  Facet loading positive: bilateral  Facets tender to palpation: bilateral  Straight leg raise test positive: Negative bilaterally    SI Joint Exam:  Dejah positive: Negative  PSIS tender: right   SI joint palpation: Negative  SI joint compression: Negative     Neurological: She is alert and oriented to " person, place, and time. No cranial nerve deficit.   Skin: Skin is warm and dry. Capillary refill takes 2 to 3 seconds. No rash noted.   Psychiatric: She has a normal mood and affect. Her behavior is normal. Judgment and thought content normal.   Nursing note and vitals reviewed.      Imaging:  Ct Abdomen Pelvis Without Contrast    Result Date: 6/29/2020   1. No urinary tract stone or hydronephrosis. 2. Left nephrectomy. Soft tissue nodule in the left nephrectomy bed is unchanged since 2014 in keeping with benign finding. 3. No acute findings are seen within the abdomen or pelvis to explain the patient's right flank pain. The appendix is not visualized. No pericecal inflammation is seen. 4. 1/2 anterolisthesis L4 upon L5 secondary to severe facet arthropathy. 5. Wedge resection changes in the right lower lobe lung. 6. Cholecystectomy.    Electronically Signed By-Dr. Huong Jc MD On:6/29/2020 1:29 PM This report was finalized on [id number] by Dr. Huong Jc MD.       Assessment/Plan     Assessment: This is a 65-year-old female who presents with chronic low back pain which is initially very severe back in February.  She states that since that time, the back pain is gotten significantly better, but she elected to continue keeping her appointment.    Diagnosis/Plan:    1.  Lumbar spondylosis  2.  Degenerative disc disease  3.  Status post nephrectomy    PLAN:  1.  At this time, she is in addition in any interventional therapy as she states the pain is not very severe.  2.  I will refer to physical therapy for conservative management  3.  She can continue taking her pain medications prescribed by her primary care provider.  She states that she only takes the medication very sparingly  4.  We will follow-up as needed      --- Follow-up follow-up PRN           INSPECT REPORT    As part of the patient's treatment plan, I may be prescribing controlled substances. The patient has been made aware of appropriate  use of such medications, including potential risk of somnolence, limited ability to drive and/or work safely, and the potential for dependence or overdose. It has also been made clear that these medications are for use by this patient only, without concomitant use of alcohol or other substances unless prescribed.     Patient has completed prescribing agreement detailing terms of continued prescribing of controlled substances, including monitoring INSPECT reports, urine drug screening, and pill counts if necessary. The patient is aware that inappropriate use will results in cessation of prescribing such medications.    INSPECT report has been reviewed and scanned into the patient's chart.    As the clinician, I personally reviewed the INSPECT from 8/21/2020 while the patient was in the office today.    History and physical exam exhibit continued safe and appropriate use of controlled substances.         EMR Dragon/Transcription disclaimer:   Much of this encounter note is an electronic transcription/translation of spoken language to printed text. The electronic translation of spoken language may permit erroneous, or at times, nonsensical words or phrases to be inadvertently transcribed; Although I have reviewed the note for such errors, some may still exist.

## 2020-09-09 ENCOUNTER — RESULTS ENCOUNTER (OUTPATIENT)
Dept: FAMILY MEDICINE CLINIC | Facility: CLINIC | Age: 65
End: 2020-09-09

## 2020-09-09 ENCOUNTER — OFFICE VISIT (OUTPATIENT)
Dept: FAMILY MEDICINE CLINIC | Facility: CLINIC | Age: 65
End: 2020-09-09

## 2020-09-09 VITALS
WEIGHT: 172 LBS | TEMPERATURE: 98.6 F | SYSTOLIC BLOOD PRESSURE: 125 MMHG | HEIGHT: 62 IN | BODY MASS INDEX: 31.65 KG/M2 | HEART RATE: 97 BPM | DIASTOLIC BLOOD PRESSURE: 64 MMHG | OXYGEN SATURATION: 94 %

## 2020-09-09 DIAGNOSIS — E11.9 TYPE 2 DIABETES MELLITUS WITHOUT COMPLICATION, WITHOUT LONG-TERM CURRENT USE OF INSULIN (HCC): ICD-10-CM

## 2020-09-09 DIAGNOSIS — Z23 NEED FOR VACCINATION: ICD-10-CM

## 2020-09-09 DIAGNOSIS — M54.50 ACUTE RIGHT-SIDED LOW BACK PAIN WITHOUT SCIATICA: ICD-10-CM

## 2020-09-09 DIAGNOSIS — Z12.11 SCREEN FOR COLON CANCER: ICD-10-CM

## 2020-09-09 DIAGNOSIS — Z72.0 TOBACCO ABUSE: ICD-10-CM

## 2020-09-09 DIAGNOSIS — Z00.00 MEDICARE ANNUAL WELLNESS VISIT, SUBSEQUENT: Primary | ICD-10-CM

## 2020-09-09 DIAGNOSIS — Z87.891 PERSONAL HISTORY OF NICOTINE DEPENDENCE: ICD-10-CM

## 2020-09-09 PROCEDURE — G0008 ADMIN INFLUENZA VIRUS VAC: HCPCS | Performed by: FAMILY MEDICINE

## 2020-09-09 PROCEDURE — 90694 VACC AIIV4 NO PRSRV 0.5ML IM: CPT | Performed by: FAMILY MEDICINE

## 2020-09-09 PROCEDURE — G0439 PPPS, SUBSEQ VISIT: HCPCS | Performed by: FAMILY MEDICINE

## 2020-09-09 RX ORDER — HYDROCODONE BITARTRATE AND ACETAMINOPHEN 7.5; 325 MG/1; MG/1
1 TABLET ORAL EVERY 6 HOURS PRN
Qty: 28 TABLET | Refills: 0 | Status: SHIPPED | OUTPATIENT
Start: 2020-09-09 | End: 2021-08-30 | Stop reason: SDUPTHER

## 2020-09-09 RX ORDER — HYDROCODONE BITARTRATE AND ACETAMINOPHEN 7.5; 325 MG/1; MG/1
1 TABLET ORAL EVERY 6 HOURS PRN
Qty: 28 TABLET | Refills: 0 | Status: CANCELLED | OUTPATIENT
Start: 2020-09-09

## 2020-09-09 NOTE — PROGRESS NOTES
Medicare Wellness Visit   The ABC's of the Annual Wellness Visit    Chief Complaint   Patient presents with   • Medicare Wellness-subsequent     will come back for labs   • Immunizations     FLU       HPI:  Tania Blunt, -1955, is a 65 y.o. female who presents for a Medicare Wellness Visit.    Recent Hospitalizations:  No hospitalization(s) within the last year..    Current Medical Providers:  Patient Care Team:  Arti Aceves MD as PCP - General  Arti Aceves MD as PCP - Claims Attributed  Banet, Duane Edward, MD as Consulting Physician (Dermatology)    Health Habits and Functional and Cognitive Screening and Depression Screening:  Functional & Cognitive Status 2020   Do you have difficulty preparing food and eating? No   Do you have difficulty bathing yourself, getting dressed or grooming yourself? No   Do you have difficulty using the toilet? No   Do you have difficulty moving around from place to place? No   Do you have trouble with steps or getting out of a bed or a chair? No   Current Diet Well Balanced Diet   Dental Exam Up to date   Eye Exam Not up to date   Exercise (times per week) 1 times per week   Current Exercise Activities Include Walking   Do you need help using the phone?  No   Are you deaf or do you have serious difficulty hearing?  No   Do you need help with transportation? No   Do you need help shopping? No   Do you need help preparing meals?  No   Do you need help with housework?  No   Do you need help with laundry? No   Do you need help taking your medications? No   Do you ever drive or ride in a car without wearing a seat belt? No   Have you felt unusual stress, anger or loneliness in the last month? No   Who do you live with? Other   If you need help, do you have trouble finding someone available to you? No   Do you have difficulty concentrating, remembering or making decisions? No       Compared to one year ago, the patient feels her physical health is the same and  her mental health is the same.    Depression Screen:  PHQ-2/PHQ-9 Depression Screening 9/9/2020   Little interest or pleasure in doing things 0   Feeling down, depressed, or hopeless 0   Trouble falling or staying asleep, or sleeping too much 0   Feeling tired or having little energy 0   Poor appetite or overeating 0   Feeling bad about yourself - or that you are a failure or have let yourself or your family down -   Trouble concentrating on things, such as reading the newspaper or watching television -   Moving or speaking so slowly that other people could have noticed. Or the opposite - being so fidgety or restless that you have been moving around a lot more than usual -   Thoughts that you would be better off dead, or of hurting yourself in some way -   Total Score 0   If you checked off any problems, how difficult have these problems made it for you to do your work, take care of things at home, or get along with other people? -         Past Medical/Family/Social History:  The following portions of the patient's history were reviewed and updated as appropriate: allergies, current medications, past family history, past medical history, past social history, past surgical history and problem list.    No Known Allergies      Current Outpatient Medications:   •  aspirin 81 MG tablet, Take 1 tablet by mouth 2 (Two) Times a Day., Disp: , Rfl:   •  baclofen (LIORESAL) 10 MG tablet, Take 1 tablet by mouth 3 (Three) Times a Day., Disp: 30 tablet, Rfl: 0  •  Blood Glucose Monitoring Suppl (ONE TOUCH ULTRA 2) w/Device kit, ONETOUCH ULTRA 2 w/Device KIT, Disp: , Rfl:   •  Calcipotriene 0.005 % ointment, HAYLEY 5 TO 10 GTS AA ON EACH HAND AND FOOT B APPLYING CLOBETASOL, Disp: , Rfl:   •  clobetasol (TEMOVATE) 0.05 % ointment, APPLY TO HANDS AND FEET QD, Disp: , Rfl: 3  •  Dexilant 60 MG capsule, TAKE 1 CAPSULE BY MOUTH DAILY, Disp: 30 capsule, Rfl: 5  •  gabapentin (NEURONTIN) 400 MG capsule, TAKE 1 CAPSULE BY MOUTH THREE TIMES  DAILY FOR NERVE PAIN, Disp: 270 capsule, Rfl: 1  •  glimepiride (AMARYL) 2 MG tablet, Take 1 tablet by mouth Daily With Breakfast & Dinner., Disp: 180 tablet, Rfl: 1  •  glucose blood test strip, TEST BLOOD SUGAR TWICE DAILY DUE TO HIGH VARIABLE BLOOD SUGAR, Disp: 200 each, Rfl: 5  •  hydrALAZINE (APRESOLINE) 25 MG tablet, TAKE 1 TABLET BY MOUTH TWICE DAILY, Disp: 180 tablet, Rfl: 1  •  HYDROcodone-acetaminophen (Norco) 7.5-325 MG per tablet, Take 1 tablet by mouth Every 6 (Six) Hours As Needed for Moderate Pain ., Disp: 28 tablet, Rfl: 0  •  isosorbide mononitrate (IMDUR) 60 MG 24 hr tablet, Take 1 tablet by mouth Daily., Disp: 180 tablet, Rfl: 0  •  JANUMET XR  MG tablet, TAKE 1 TABLET BY MOUTH TWICE DAILY, Disp: 180 tablet, Rfl: 3  •  Lancets Thin misc, LANCETS THIN, Disp: , Rfl:   •  metoprolol tartrate (LOPRESSOR) 25 MG tablet, TAKE 1 TABLET BY MOUTH TWICE DAILY, Disp: 180 tablet, Rfl: 1  •  OTEZLA 30 MG tablet, , Disp: , Rfl:   •  simvastatin (ZOCOR) 40 MG tablet, TAKE 1 TABLET BY MOUTH EVERY EVENING FOR HIGH CHOLESTEROL, Disp: 90 tablet, Rfl: 1    Aspirin use counseling: Taking ASA appropriately as indicated    Current medication list contains no high risk medications.  No harmful drug interactions have been identified.     Family History   Problem Relation Age of Onset   • Hypertension Mother    • Colon cancer Mother    • Lung cancer Father    • Heart disease Maternal Grandmother        Social History     Tobacco Use   • Smoking status: Current Every Day Smoker     Packs/day: 1.00     Years: 50.00     Pack years: 50.00     Types: Cigarettes   • Smokeless tobacco: Never Used   Substance Use Topics   • Alcohol use: No     Frequency: Never     Comment: drinks caffeine free sodas       Past Surgical History:   Procedure Laterality Date   • APPENDECTOMY     • CARDIAC CATHETERIZATION  07/29/2015    No Intervention: Disease noted RCA & Circumflex Artery   • GALLBLADDER SURGERY     • LAPAROSCOPIC NEPHRECTOMY  "Left     Due to Congenital Deformity   • TUBAL ABDOMINAL LIGATION Bilateral        Patient Active Problem List   Diagnosis   • Dysuria   • Frequency of urination   • Coronary artery disease   • Disorder of lung   • Encounter for general adult medical examination without abnormal findings   • Family history of malignant neoplasm of colon   • Gastroesophageal reflux disease   • Histoplasmosis   • Hyperkalemia   • Hyperlipidemia   • Hypertension   • Hypoxemia   • Psoriasis   • Sleep apnea   • Type 2 diabetes mellitus without complication (CMS/HCC)   • Degenerative arthritis of thumb, left   • Screen for colon cancer   • Tobacco dependence syndrome   • Acute right-sided low back pain without sciatica   • Right flank pain   • Medicare annual wellness visit, subsequent       Review of Systems   Constitutional: Negative for chills and fever.   HENT: Negative for sinus pressure, sore throat and swollen glands.    Eyes: Negative for blurred vision.   Respiratory: Negative for cough, shortness of breath and wheezing.    Cardiovascular: Negative for chest pain and palpitations.   Gastrointestinal: Negative for abdominal pain.   Endocrine: Negative for polyuria.   Genitourinary: Negative for difficulty urinating.   Skin: Positive for rash.   Neurological: Negative for dizziness, seizures and headache.   Hematological: Negative for adenopathy.   Psychiatric/Behavioral: Negative for depressed mood.       Objective     Vitals:    09/09/20 1433   BP: 125/64   BP Location: Left arm   Patient Position: Sitting   Cuff Size: Adult   Pulse: 97   Temp: 98.6 °F (37 °C)   SpO2: 94%   Weight: 78 kg (172 lb)   Height: 158.1 cm (62.25\")       Patient's Body mass index is 31.21 kg/m². BMI is above normal parameters. Recommendations include: exercise counseling.      No exam data present    The patient has no evidence of cognitve impairment.     Physical Exam   Constitutional: She is oriented to person, place, and time. She appears " well-developed. No distress.   HENT:   Head: Normocephalic.   Eyes: Conjunctivae and lids are normal.   Neck: Trachea normal. No thyroid mass and no thyromegaly present.   Cardiovascular: Normal rate, regular rhythm and normal heart sounds.   Pulmonary/Chest: Effort normal and breath sounds normal.   Lymphadenopathy:     She has no cervical adenopathy.   Neurological: She is alert and oriented to person, place, and time.   Skin: Skin is warm and dry. Rash noted. Rash is maculopapular.   Psychiatric: Her speech is normal and behavior is normal. She is attentive.       Recent Lab Results:     Lab Results   Component Value Date    CHOL 160 09/15/2020    TRIG 246 (H) 09/15/2020    HDL 44 09/15/2020    VLDL 49.2 (H) 09/15/2020    LDLHDL 1.52 09/15/2020     Office Visit on 09/09/2020   Component Date Value Ref Range Status   • Hemoglobin A1C 09/15/2020 9.5* 3.5 - 5.6 % Final   • Glucose 09/15/2020 122* 65 - 99 mg/dL Final   • BUN 09/15/2020 18  8 - 23 mg/dL Final   • Creatinine 09/15/2020 0.90  0.57 - 1.00 mg/dL Final   • Sodium 09/15/2020 139  136 - 145 mmol/L Final   • Potassium 09/15/2020 4.9  3.5 - 5.2 mmol/L Final   • Chloride 09/15/2020 102  98 - 107 mmol/L Final   • CO2 09/15/2020 27.9  22.0 - 29.0 mmol/L Final   • Calcium 09/15/2020 9.7  8.6 - 10.5 mg/dL Final   • Total Protein 09/15/2020 7.8  6.0 - 8.5 g/dL Final   • Albumin 09/15/2020 4.00  3.50 - 5.20 g/dL Final   • ALT (SGPT) 09/15/2020 10  1 - 33 U/L Final   • AST (SGOT) 09/15/2020 14  1 - 32 U/L Final   • Alkaline Phosphatase 09/15/2020 96  39 - 117 U/L Final   • Total Bilirubin 09/15/2020 0.4  0.0 - 1.2 mg/dL Final   • eGFR Non African Amer 09/15/2020 63  >60 mL/min/1.73 Final   • Globulin 09/15/2020 3.8  gm/dL Final   • A/G Ratio 09/15/2020 1.1  g/dL Final   • BUN/Creatinine Ratio 09/15/2020 20.0  7.0 - 25.0 Final   • Anion Gap 09/15/2020 9.1  5.0 - 15.0 mmol/L Final   • Total Cholesterol 09/15/2020 160  0 - 200 mg/dL Final   • Triglycerides 09/15/2020  246* 0 - 150 mg/dL Final   • HDL Cholesterol 09/15/2020 44  40 - 60 mg/dL Final   • LDL Cholesterol  09/15/2020 67  0 - 100 mg/dL Final   • VLDL Cholesterol 09/15/2020 49.2* 5 - 40 mg/dL Final   • LDL/HDL Ratio 09/15/2020 1.52   Final         Assessment/Plan   Age-appropriate Screening Schedule:  Refer to the list below for future screening recommendations based on patient's age, sex and/or medical conditions.      Health Maintenance   Topic Date Due   • COLONOSCOPY  1955   • TDAP/TD VACCINES (1 - Tdap) 07/26/1974   • ZOSTER VACCINE (1 of 2) 07/26/2005   • URINE MICROALBUMIN  09/20/2018   • DIABETIC FOOT EXAM  09/12/2019   • DIABETIC EYE EXAM  09/12/2019   • HEMOGLOBIN A1C  03/15/2021   • LIPID PANEL  09/15/2021   • MAMMOGRAM  02/12/2022   • INFLUENZA VACCINE  Completed       Medicare Risks and Personalized Health Plan:  Advance Directive Discussion  Breast Cancer/Mammogram Screening  Colon Cancer Screening  Diabetic Lab Screening   Immunizations Discussed/Encouraged (specific immunizations; Influenza and Prevnar )      CMS-Preventive Services Quick Reference  Medicare Preventive Services Addressed:  Annual Wellness Visit (AWV)  Colorectal Cancer Screening, Colonoscopy  Diabetes Screening-Lab Order for either glucose quantitative blood (except reagent strip), glucose;post glucose dose(includes glucose), or glucose tolerance test-3 specimens(includes glucose)  Screening Mammography     Advance Care Planning:  ACP discussion was held with the patient during this visit. Patient has an advance directive (not in EMR), copy requested.    Diagnoses and all orders for this visit:    1. Medicare annual wellness visit, subsequent (Primary)    2. Type 2 diabetes mellitus without complication, without long-term current use of insulin (CMS/[facility])  -     Hemoglobin A1c  -     Comprehensive Metabolic Panel  -     Lipid Panel    3. Screen for colon cancer  -     Cologuard - Stool, Per Rectum; Future    4. Acute right-sided low  back pain without sciatica  -     Ambulatory Referral to Physical Therapy Evaluate and treat  -     HYDROcodone-acetaminophen (Norco) 7.5-325 MG per tablet; Take 1 tablet by mouth Every 6 (Six) Hours As Needed for Moderate Pain .  Dispense: 28 tablet; Refill: 0    5. Need for vaccination  -     pneumococcal conj. 13-valent (PREVNAR-13) vaccine 0.5 mL  -     Fluad Quad 65+ yrs ([id number])    6. Tobacco abuse  -     CT Chest Low Dose Wo; Future    7. Personal history of nicotine dependence   -     CT Chest Low Dose Wo; Future    Other orders  -     Cancel: HYDROcodone-acetaminophen (Norco) 7.5-325 MG per tablet; Take 1 tablet by mouth Every 6 (Six) Hours As Needed for Moderate Pain .  Dispense: 28 tablet; Refill: 0  -     SCANNED - INFLUENZA        An After Visit Summary and PPPS with all of these plans were given to the patient.      Follow Up:  Return in about 1 year (around 9/9/2021) for Medicare Wellness.

## 2020-09-15 ENCOUNTER — LAB (OUTPATIENT)
Dept: FAMILY MEDICINE CLINIC | Facility: CLINIC | Age: 65
End: 2020-09-15

## 2020-09-15 LAB
ALBUMIN SERPL-MCNC: 4 G/DL (ref 3.5–5.2)
ALBUMIN/GLOB SERPL: 1.1 G/DL
ALP SERPL-CCNC: 96 U/L (ref 39–117)
ALT SERPL W P-5'-P-CCNC: 10 U/L (ref 1–33)
ANION GAP SERPL CALCULATED.3IONS-SCNC: 9.1 MMOL/L (ref 5–15)
AST SERPL-CCNC: 14 U/L (ref 1–32)
BILIRUB SERPL-MCNC: 0.4 MG/DL (ref 0–1.2)
BUN SERPL-MCNC: 18 MG/DL (ref 8–23)
BUN/CREAT SERPL: 20 (ref 7–25)
CALCIUM SPEC-SCNC: 9.7 MG/DL (ref 8.6–10.5)
CHLORIDE SERPL-SCNC: 102 MMOL/L (ref 98–107)
CHOLEST SERPL-MCNC: 160 MG/DL (ref 0–200)
CO2 SERPL-SCNC: 27.9 MMOL/L (ref 22–29)
CREAT SERPL-MCNC: 0.9 MG/DL (ref 0.57–1)
GFR SERPL CREATININE-BSD FRML MDRD: 63 ML/MIN/1.73
GLOBULIN UR ELPH-MCNC: 3.8 GM/DL
GLUCOSE SERPL-MCNC: 122 MG/DL (ref 65–99)
HBA1C MFR BLD: 9.5 % (ref 3.5–5.6)
HDLC SERPL-MCNC: 44 MG/DL (ref 40–60)
LDLC SERPL CALC-MCNC: 67 MG/DL (ref 0–100)
LDLC/HDLC SERPL: 1.52 {RATIO}
POTASSIUM SERPL-SCNC: 4.9 MMOL/L (ref 3.5–5.2)
PROT SERPL-MCNC: 7.8 G/DL (ref 6–8.5)
SODIUM SERPL-SCNC: 139 MMOL/L (ref 136–145)
TRIGL SERPL-MCNC: 246 MG/DL (ref 0–150)
VLDLC SERPL-MCNC: 49.2 MG/DL (ref 5–40)

## 2020-09-15 PROCEDURE — 36415 COLL VENOUS BLD VENIPUNCTURE: CPT | Performed by: FAMILY MEDICINE

## 2020-09-15 PROCEDURE — 80053 COMPREHEN METABOLIC PANEL: CPT | Performed by: FAMILY MEDICINE

## 2020-09-15 PROCEDURE — 83036 HEMOGLOBIN GLYCOSYLATED A1C: CPT | Performed by: FAMILY MEDICINE

## 2020-09-15 PROCEDURE — 80061 LIPID PANEL: CPT | Performed by: FAMILY MEDICINE

## 2020-09-15 RX ORDER — DEXLANSOPRAZOLE 60 MG/1
CAPSULE, DELAYED RELEASE ORAL
Qty: 30 CAPSULE | Refills: 5 | Status: SHIPPED | OUTPATIENT
Start: 2020-09-15 | End: 2021-03-18

## 2020-09-18 RX ORDER — GLIMEPIRIDE 2 MG/1
2 TABLET ORAL
Qty: 180 TABLET | Refills: 1 | Status: SHIPPED | OUTPATIENT
Start: 2020-09-18 | End: 2021-08-10

## 2020-09-21 ENCOUNTER — TREATMENT (OUTPATIENT)
Dept: PHYSICAL THERAPY | Facility: CLINIC | Age: 65
End: 2020-09-21

## 2020-09-21 DIAGNOSIS — M54.50 ACUTE RIGHT-SIDED LOW BACK PAIN WITHOUT SCIATICA: Primary | ICD-10-CM

## 2020-09-21 PROCEDURE — 97110 THERAPEUTIC EXERCISES: CPT | Performed by: PHYSICAL THERAPIST

## 2020-09-21 PROCEDURE — 97162 PT EVAL MOD COMPLEX 30 MIN: CPT | Performed by: PHYSICAL THERAPIST

## 2020-09-21 NOTE — PROGRESS NOTES
Physical Therapy Initial Evaluation and Plan of Care    Patient: Tania Blunt   : 1955  Diagnosis/ICD-10 Code:  Acute right-sided low back pain without sciatica [M54.5]  Referring practitioner: Arti Aceves MD  Date of Initial Visit: 2020  Today's Date: 2020  Patient seen for 1 sessions           Subjective Questionnaire:  Oswestry:  40% disability.      Subjective Evaluation    History of Present Illness  Mechanism of injury: PT referral:  Acute right-sided low back pain without sciatica. Symptoms began a few months ago with no known injury.  Pain in LB is R>L sided and intermittent.  Denies n/t or other symptoms down either LE.    Increased pain with sitting >30 min.    Occupation:  On disability.    Imaging:  CT 20:  1/2 anterolisthesis L4 upon L5 secondary to severe facet arthropathy.    Denies changes in bowel/bladder function, onset of dizziness/vision issues, or other systemic problems since onset of symptoms.    Quality of life: good    Pain  Current pain ratin  At best pain ratin  At worst pain ratin  Quality: discomfort and dull ache  Relieving factors: medications, heat, change in position, relaxation, rest and support  Aggravating factors: prolonged positioning    Patient Goals  Patient goals for therapy: decreased pain             Objective          Static Posture     Lumbar Spine   Flattened.     Pelvis   Pelvis (Right): Elevated.     Palpation     Additional Palpation Details  Not completed per time.    Neurological Testing     Sensation     Lumbar   Left   Intact: light touch    Right   Intact: light touch    Reflexes   Left   Patellar (L4): trace (1+)  Achilles (S1): normal (2+)  Clonus sign: negative    Right   Patellar (L4): trace (1+)  Achilles (S1): normal (2+)  Clonus sign: negative    Active Range of Motion   Left Hip   External rotation (90/90): 22 degrees   Internal rotation (90/90): 34 degrees     Right Hip   External rotation (90/90): 25 degrees    Internal rotation (90/90): 48 degrees     Additional Active Range of Motion Details  No symptoms down either LE with standing lumbar AROM.  Flex:  LBP and 50% reduced mobility.  Ext:  LBP and 75% reduced mobility.  R lateral flex:  R LBP and 50% reduced mobility.  L lateral flex:  50% reduced mobility.    Passive Range of Motion   Left Hip   Extension: 15 (during SL'ing hip abd MMT) degrees     Right Hip   Extension: 15 (during SL'ing hip abd MMT) degrees     Strength/Myotome Testing     Left Hip   Planes of Motion   Flexion: 4+  Abduction: 4    Right Hip   Planes of Motion   Flexion: 5  Abduction: 4 (LBP)    Left Knee   Flexion: 4+  Extension: 5 (LBP)    Right Knee   Flexion: 4+  Extension: 5    Left Ankle/Foot   Dorsiflexion: 5  Inversion: 5  Great toe extension: 5    Right Ankle/Foot   Dorsiflexion: 5  Inversion: 5  Great toe extension: 5    Additional Strength Details  MMT completed in available range.    Functional Assessment     Single Leg Stance   Left: 7 (no UE support, SBA) seconds  Right: 27 (no UE support, SBA) seconds        Vitals in sitting after subjective hx-taking in LUE:  BP:  100/50 mmHg.  HR:  84 bpm.  O2:  97%.    Pt reports her diastolic is usually in the 60s.  No systemic issues per lower BP today.  Error when reading RUE.    Assessment & Plan     Assessment  Impairments: abnormal or restricted ROM, activity intolerance, impaired balance, impaired physical strength, lacks appropriate home exercise program and pain with function  Assessment details: PT referral for LBP.  No symptoms down either LE.  Presents today with impaired Oswestry score, sitting intolerance, postural abnormalities, impaired SLS, reduced and painful lumbar AROM and yudith LE MMT, and limited yudith hip IR/ER AROM.  She would benefit from skilled PT to improve her tolerance to functional mobility.    Pt with no reports of pain with initiation of there-ex today after mechanics were modified/corrected by therapist.  No  complications.  Prognosis: good  Functional Limitations: sitting  Goals  Plan Goals: STGs to be completed in 3 weeks:  -- </= 3 cues with HEP.  -- </= 7/10 pain at worst.  -- Be able to sit >/= 45 min without pain.    LTGs to be completed by end of POC:  -- No cues with HEP for d/c planning.  -- </= 5/10 pain at worst.  -- Oswestry </= 20% disability.  -- Be able to sit >/= 60 min without pain.  -- SLS on santy LEs >/= 30 sec without UE support.  -- </= 25% limitation with all standing lumbar AROM without pain.  -- Santy hip/knee MMT 5/5 without pain.  -- Santy hip IR/ER >/= 40 degrees in sitting without pain.    Plan  Therapy options: will be seen for skilled physical therapy services  Planned modality interventions: cryotherapy, electrical stimulation/Russian stimulation, TENS, thermotherapy (hydrocollator packs), traction and ultrasound  Planned therapy interventions: abdominal trunk stabilization, balance/weight-bearing training, body mechanics training, fine motor coordination training, flexibility, functional ROM exercises, home exercise program, joint mobilization, manual therapy, motor coordination training, neuromuscular re-education, postural training, soft tissue mobilization, spinal/joint mobilization, strengthening, stretching and therapeutic activities  Frequency: 2x week  Duration in visits: 20  Treatment plan discussed with: patient  Plan details: Palpation assessment PRN.        History # of Personal Factors and/or Comorbidities: MODERATE (1-2)  Examination of Body System(s): # of elements: MODERATE (3)  Clinical Presentation: EVOLVING  Clinical Decision Making: MODERATE      Timed:  Therapeutic Exercise:    24     mins  86669;        Un-Timed:  Mod Eval     23     Mins  16511      Timed Treatment:   24   mins   Total Treatment:     47   mins    PT SIGNATURE: Tristen Mena, PT   DATE TREATMENT INITIATED: 9/21/2020    Medicare Initial Certification  Certification Period: 12/20/2020  I certify that the  therapy services are furnished while this patient is under my care.  The services outlined above are required by this patient, and will be reviewed every 90 days.     PHYSICIAN: Arti Aceves MD      DATE:     Please sign and return via fax to [phone]. Thank you, [facility].

## 2020-09-27 NOTE — PATIENT INSTRUCTIONS
Medicare Wellness  Personal Prevention Plan of Service     Date of Office Visit:  2020  Encounter Provider:  Arti Aceves MD  Place of Service:  [facility]  Patient Name: Tania Blunt  :  1955    As part of the Medicare Wellness portion of your visit today, we are providing you with this personalized preventive plan of services (PPPS). This plan is based upon recommendations of the [place] Preventive Services Task Force (USPSTF) and the Advisory Committee on Immunization Practices (ACIP).    This lists the preventive care services that should be considered, and provides dates of when you are due. Items listed as completed are up-to-date and do not require any further intervention.    Health Maintenance   Topic Date Due   • COLONOSCOPY  1955   • TDAP/TD VACCINES (1 - Tdap) 1974   • ZOSTER VACCINE (1 of 2) 2005   • LUNG CANCER SCREENING  2010   • URINE MICROALBUMIN  2018   • HEPATITIS C SCREENING  2019   • MEDICARE ANNUAL WELLNESS  2019   • DIABETIC FOOT EXAM  2019   • DIABETIC EYE EXAM  2019   • HEMOGLOBIN A1C  03/15/2021   • Pneumococcal Vaccine 65+ (2 of 2 - PPSV23) 2021   • LIPID PANEL  09/15/2021   • MAMMOGRAM  2022   • INFLUENZA VACCINE  Completed       Orders Placed This Encounter   Procedures   • CT Chest Low Dose Wo     Standing Status:   Future     Standing Expiration Date:   2021     Order Specific Question:   The patient is age 55-77:     Answer:   65     Order Specific Question:   The patient is a current smoker?     Answer:   Yes     Order Specific Question:   The patient has a smoking history of 30 pack-years or greater:     Answer:   Yes     Order Specific Question:   Actual pack - year smoking history (number):     Answer:   50     Order Specific Question:   Has the Patient had a Chest CT scan within the past 12 months?     Answer:   No     Order Specific Question:    Does the patient have any clinical signs/symptoms of lung cancer?     Answer:   No     Order Specific Question:   The patient was engaged in shared decision-making for this test:     Answer:   Yes   • Fluad Quad 65+ yrs (2894-5915)   • Cologuard - Stool, Per Rectum     Standing Status:   Future     Number of Occurrences:   1     Standing Expiration Date:   9/9/2021   • Hemoglobin A1c   • Comprehensive Metabolic Panel   • Lipid Panel   • Ambulatory Referral to Physical Therapy Evaluate and treat     Referral Priority:   Routine     Referral Type:   Therapy     Referral Reason:   Specialty Services Required     Requested Specialty:   Physical Therapy     Number of Visits Requested:   1   • SCANNED - INFLUENZA       Return in about 1 year (around 9/9/2021) for Medicare Wellness.

## 2020-09-29 ENCOUNTER — TREATMENT (OUTPATIENT)
Dept: PHYSICAL THERAPY | Facility: CLINIC | Age: 65
End: 2020-09-29

## 2020-09-29 DIAGNOSIS — M54.50 ACUTE RIGHT-SIDED LOW BACK PAIN WITHOUT SCIATICA: Primary | ICD-10-CM

## 2020-09-29 PROCEDURE — 97110 THERAPEUTIC EXERCISES: CPT | Performed by: PHYSICAL THERAPIST

## 2020-09-29 NOTE — PROGRESS NOTES
Physical Therapy Daily Progress Note    Patient: Tania Blunt  : 1955  Referring practitioner: Arti Aceves MD  Today's Date: 2020    VISIT#: 2    Subjective   Tania Blunt reports: Says she is doing ok, has been working on exercises. Low back is sore today, 5/10 pain right now.       Objective     See Exercise, Manual, and Modality Logs for complete treatment.     Patient Education: modified and progressed HEP    Assessment/Plan  Good response to session, corrected form with most exercises. She had less low back pain after therapy.     Progress per Plan of Care            Timed:         Manual Therapy:         mins  98024;     Therapeutic Exercise:    40     mins  90435;     Neuromuscular Rl:        mins  68237;    Therapeutic Activity:         mins  01054;     Gait Training:           mins  91333;     Ultrasound:          mins  26941;    Ionto:                                   mins   72707  Self Care:                            mins   52891    Un-Timed:  Electrical Stimulation:         mins  39995 (MC );  Dry Needling          mins self-pay  Traction          mins 05863  Re-Eval                               mins  74984    Timed Treatment:   40   mins   Total Treatment:     40   mins    Elda Young PT  Physical Therapist

## 2020-09-30 ENCOUNTER — TREATMENT (OUTPATIENT)
Dept: PHYSICAL THERAPY | Facility: CLINIC | Age: 65
End: 2020-09-30

## 2020-09-30 DIAGNOSIS — M54.50 ACUTE RIGHT-SIDED LOW BACK PAIN WITHOUT SCIATICA: Primary | ICD-10-CM

## 2020-09-30 PROCEDURE — 97110 THERAPEUTIC EXERCISES: CPT | Performed by: PHYSICAL THERAPIST

## 2020-09-30 PROCEDURE — G0283 ELEC STIM OTHER THAN WOUND: HCPCS | Performed by: PHYSICAL THERAPIST

## 2020-09-30 PROCEDURE — 97140 MANUAL THERAPY 1/> REGIONS: CPT | Performed by: PHYSICAL THERAPIST

## 2020-09-30 NOTE — PROGRESS NOTES
Physical Therapy Daily Progress Note    Patient: Tania Blunt  : 1955  Referring practitioner: Arti Aceves MD  Today's Date: 2020    VISIT#: 3    Subjective   Tania Blunt reports: Doing pretty well, felt good after last session and still feeling pretty good today. 3/10 pain currently.     Objective     See Exercise, Manual, and Modality Logs for complete treatment.     Patient Education: continue HEP.     Assessment & Plan     Assessment  Assessment details: Good response to session. Tolerated exercises well, less cues required today for proper form. Ended with estim today due to slight residual low back pain.       Progress per Plan of Care        Timed:         Manual Therapy:    8     mins  66140;     Therapeutic Exercise:    35     mins  74201;     Neuromuscular Rl:        mins  31212;    Therapeutic Activity:          mins  20332;     Gait Training:           mins  61015;     Ultrasound:          mins  21455;    Ionto:                                   mins   40380  Self Care:                            mins   30129    Un-Timed:  Electrical Stimulation:   20      mins  99781 ( );  Dry Needling          mins self-pay  Traction          mins 02736  Re-Eval                               mins  05514    Timed Treatment:   8   mins   Total Treatment:     63   mins    Elda Young PT  Physical Therapist

## 2020-10-08 ENCOUNTER — TREATMENT (OUTPATIENT)
Dept: PHYSICAL THERAPY | Facility: CLINIC | Age: 65
End: 2020-10-08

## 2020-10-08 DIAGNOSIS — M54.50 ACUTE RIGHT-SIDED LOW BACK PAIN WITHOUT SCIATICA: Primary | ICD-10-CM

## 2020-10-08 PROCEDURE — 97140 MANUAL THERAPY 1/> REGIONS: CPT | Performed by: PHYSICAL THERAPIST

## 2020-10-08 PROCEDURE — 97110 THERAPEUTIC EXERCISES: CPT | Performed by: PHYSICAL THERAPIST

## 2020-10-08 NOTE — PROGRESS NOTES
Physical Therapy Daily Progress Note    Patient: Tania Blunt  : 1955  Referring practitioner: Arti Aceves MD  Today's Date: 10/8/2020    VISIT#: 4    Subjective   Tania Blunt reports: Says she went on a trip and her back did well. Overall been doing better, but hasn't been doing her exercises at all. Did a lot of walking. Pain is 2/10 currently.       Objective     See Exercise, Manual, and Modality Logs for complete treatment.     Patient Education: HEP education.    Assessment & Plan     Assessment  Assessment details: Good response to session, less pain after. Did not progress HEP due to pt had not been performing exercises at home, however she reports she is going to start performing it.           Progress per Plan of Care    Addended note to add Manual therapy to flowsheet due to forgot to add it.        Timed:         Manual Therapy:    8     mins  67078;     Therapeutic Exercise:    35     mins  39000;     Neuromuscular Rl:        mins  66122;    Therapeutic Activity:          mins  40051;     Gait Training:           mins  73900;     Ultrasound:          mins  23563;    Ionto:                                   mins   16485  Self Care:                            mins   43814    Un-Timed:  Electrical Stimulation:         mins  41042 ( );  Dry Needling          mins self-pay  Traction          mins 14026  Re-Eval                               mins  54031    Timed Treatment:   43   mins   Total Treatment:     43   mins    Elda Young PT  Physical Therapist

## 2020-10-09 ENCOUNTER — OFFICE VISIT (OUTPATIENT)
Dept: CARDIOLOGY | Facility: CLINIC | Age: 65
End: 2020-10-09

## 2020-10-09 DIAGNOSIS — E11.9 TYPE 2 DIABETES MELLITUS WITHOUT COMPLICATION, WITHOUT LONG-TERM CURRENT USE OF INSULIN (HCC): ICD-10-CM

## 2020-10-09 DIAGNOSIS — I25.10 CORONARY ARTERY DISEASE INVOLVING NATIVE CORONARY ARTERY OF NATIVE HEART WITHOUT ANGINA PECTORIS: Primary | ICD-10-CM

## 2020-10-09 DIAGNOSIS — G47.33 OBSTRUCTIVE SLEEP APNEA SYNDROME: ICD-10-CM

## 2020-10-09 DIAGNOSIS — E78.2 MIXED HYPERLIPIDEMIA: ICD-10-CM

## 2020-10-09 DIAGNOSIS — I10 ESSENTIAL HYPERTENSION: ICD-10-CM

## 2020-10-09 PROCEDURE — 99213 OFFICE O/P EST LOW 20 MIN: CPT | Performed by: INTERNAL MEDICINE

## 2020-10-09 PROCEDURE — 93000 ELECTROCARDIOGRAM COMPLETE: CPT | Performed by: INTERNAL MEDICINE

## 2020-10-09 RX ORDER — ISOSORBIDE MONONITRATE 60 MG/1
60 TABLET, EXTENDED RELEASE ORAL EVERY 24 HOURS
Qty: 90 TABLET | Refills: 3 | Status: SHIPPED | OUTPATIENT
Start: 2020-10-09 | End: 2021-12-13 | Stop reason: SDUPTHER

## 2020-10-09 NOTE — PROGRESS NOTES
Subjective:     Encounter Date:10/09/2020      Patient ID: Tania Blunt is a 65 y.o. female.    Chief Complaint: Coronary Artery Disease  History of Present Illness         65-year-old white female patient with a known history of CAD on medical treatment comes back for followup. patient had cardiac catheterization done April 2013,  showed up to 50% ostial RCA disease and  40% ostial circumflex artery disease.         Patient underwent repeat cardiac catheterization due to abnormal stress test in july 2015,  which showed moderate to severe right coronary artery  disease noted.  Mild to moderate circumflex artery disease noted.      Stress Myoview showed fixed anteroapical defect  July 2016   EKG today sinus rhythm without any ST-T abnormalities     Patient is doing much better now without any symptoms of chest pain  Patient was strongly advised to stop smoking   Patient is doing well from the cardiac standpoint recent labs triglycerides 246 blood sugar 122 so patient was advised to take fish oil regularly which she is not and also monitor blood sugars closely    Patient was advised to quit smokingAnd her recheck blood pressure 136/70   previously I stopped the lisinopril and started on hydralazine      follow up in 6 months with labs    The following portions of the patient's history were reviewed and updated as appropriate: Allergies current medications past family history past medical history past social history past surgical history problem list and review of systems  Past Medical History:   Diagnosis Date   • Arthritis    • CAD (coronary artery disease)    • Diabetes (CMS/HCC)    • GERD (gastroesophageal reflux disease)    • Heart disease    • Hypertension    • Hypoxemia      Past Surgical History:   Procedure Laterality Date   • APPENDECTOMY     • CARDIAC CATHETERIZATION  07/29/2015    No Intervention: Disease noted RCA & Circumflex Artery   • COLONOSCOPY  09/21/2020    Cologuard negative.   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   • TUBAL ABDOMINAL LIGATION Bilateral      /70   Pulse 80   Ht 160 cm (63\")   Wt 79.4 kg (175 lb)   SpO2 99%   Breastfeeding No   BMI 31.00 kg/m²   Family History   Problem Relation Age of Onset   • Hypertension Mother    • Colon cancer Mother    • Lung cancer Father    • Heart disease Maternal Grandmother        Current Outpatient Medications:   •  aspirin 81 MG tablet, Take 1 tablet by mouth 2 (Two) Times a Day., Disp: , Rfl:   •  Blood Glucose Monitoring Suppl (ONE TOUCH ULTRA 2) w/Device kit, ONETOUCH ULTRA 2 w/Device KIT, Disp: , Rfl:   •  Calcipotriene 0.005 % ointment, HAYLEY 5 TO 10 GTS AA ON EACH HAND AND FOOT B APPLYING CLOBETASOL, Disp: , Rfl:   •  clobetasol (TEMOVATE) 0.05 % ointment, APPLY TO HANDS AND FEET QD, Disp: , Rfl: 3  •  Dexilant 60 MG capsule, TAKE 1 CAPSULE BY MOUTH DAILY, Disp: 30 capsule, Rfl: 5  •  gabapentin (NEURONTIN) 400 MG capsule, TAKE 1 CAPSULE BY MOUTH THREE TIMES DAILY FOR NERVE PAIN, Disp: 270 capsule, Rfl: 1  •  glimepiride (AMARYL) 2 MG tablet, Take 1 tablet by mouth Daily With Breakfast & Dinner., Disp: 180 tablet, Rfl: 1  •  glucose blood test strip, TEST BLOOD SUGAR TWICE DAILY DUE TO HIGH VARIABLE BLOOD SUGAR, Disp: 200 each, Rfl: 5  •  hydrALAZINE (APRESOLINE) 25 MG tablet, TAKE 1 TABLET BY MOUTH TWICE DAILY, Disp: 180 tablet, Rfl: 1  •  HYDROcodone-acetaminophen (Norco) 7.5-325 MG per tablet, Take 1 tablet by mouth Every 6 (Six) Hours As Needed for Moderate Pain ., Disp: 28 tablet, Rfl: 0  •  isosorbide mononitrate (IMDUR) 60 MG 24 hr tablet, Take 1 tablet by mouth Daily., Disp: 90 tablet, Rfl: 3  •  JANUMET XR  MG tablet, TAKE 1 TABLET BY MOUTH TWICE DAILY, Disp: 180 tablet, Rfl: 3  •  Lancets Thin misc, LANCETS THIN, Disp: , Rfl:   •  metoprolol tartrate (LOPRESSOR) 25 MG tablet, Take 1 tablet by mouth 2 (Two) Times a Day., Disp: 180 tablet, Rfl: 3  •  OTEZLA 30 MG " tablet, Take 30 mg by mouth Daily., Disp: , Rfl:   •  simvastatin (ZOCOR) 40 MG tablet, TAKE 1 TABLET BY MOUTH EVERY EVENING FOR HIGH CHOLESTEROL, Disp: 90 tablet, Rfl: 1  Social History     Socioeconomic History   • Marital status:      Spouse name: Not on file   • Number of children: Not on file   • Years of education: Not on file   • Highest education level: Not on file   Tobacco Use   • Smoking status: Current Every Day Smoker     Packs/day: 1.00     Years: 50.00     Pack years: 50.00     Types: Cigarettes   • Smokeless tobacco: Never Used   Substance and Sexual Activity   • Alcohol use: No     Frequency: Never     Comment: drinks caffeine free sodas   • Drug use: Not Currently   • Sexual activity: Defer     No Known Allergies  Review of Systems   Constitution: Negative for fever and malaise/fatigue.   HENT: Negative for congestion and hearing loss.    Eyes: Negative for double vision and visual disturbance.   Cardiovascular: Negative for chest pain, claudication, dyspnea on exertion, leg swelling and syncope.   Respiratory: Negative for cough and shortness of breath.    Endocrine: Negative for cold intolerance.   Skin: Negative for color change and rash.   Musculoskeletal: Negative for arthritis and joint pain.   Gastrointestinal: Negative for abdominal pain and heartburn.   Genitourinary: Negative for hematuria.   Neurological: Negative for excessive daytime sleepiness and dizziness.   Psychiatric/Behavioral: Negative for depression. The patient is not nervous/anxious.    All other systems reviewed and are negative.             Objective:     Constitutional:       Appearance: Well-developed.   Eyes:      General: No scleral icterus.     Conjunctiva/sclera: Conjunctivae normal.   HENT:      Head: Normocephalic and atraumatic.    Mouth/Throat:      Mouth: No oral lesions.      Pharynx: Uvula midline.   Neck:      Musculoskeletal: Neck supple.      Thyroid: No thyromegaly.      Vascular: No carotid bruit  or JVD.      Trachea: Trachea normal.   Pulmonary:      Effort: Pulmonary effort is normal.      Breath sounds: Normal breath sounds.   Cardiovascular:      Normal rate. Regular rhythm.      No gallop.   Pulses:     Intact distal pulses.   Abdominal:      General: Bowel sounds are normal.      Palpations: Abdomen is soft.   Musculoskeletal: Normal range of motion.   Skin:     General: Skin is warm. There is no cyanosis.   Neurological:      Mental Status: Alert and oriented to person, place, and time.      Comments: No focal deficits   Psychiatric:         Behavior: Behavior is cooperative.           ECG 12 Lead    Date/Time: 10/9/2020 1:47 PM  Performed by: Benny Hall MD  Authorized by: Benny Hall MD   Comments: EKG normal sinus rhythmNormal axis no changes compared to the last EKG            Lab Review:       Assessment:          Diagnosis Plan   1. Coronary artery disease involving native coronary artery of native heart without angina pectoris  Comprehensive Metabolic Panel    Lipid Panel    CK   2. Mixed hyperlipidemia  Comprehensive Metabolic Panel    Lipid Panel    CK   3. Essential hypertension  Comprehensive Metabolic Panel    Lipid Panel    CK   4. Obstructive sleep apnea syndrome  Comprehensive Metabolic Panel    Lipid Panel    CK   5. Type 2 diabetes mellitus without complication, without long-term current use of insulin (CMS/[facility])  Comprehensive Metabolic Panel    Lipid Panel    CK          Plan:         Needs aggressive control of hypertension dyslipidemia diabetes modify cardiac risk factors  Needs aggressive control of obstructive sleep apnea  Needs aggressive control of cardiac risk factors   CAD stable

## 2020-10-11 VITALS
DIASTOLIC BLOOD PRESSURE: 70 MMHG | HEIGHT: 63 IN | HEART RATE: 80 BPM | WEIGHT: 175 LBS | BODY MASS INDEX: 31.01 KG/M2 | SYSTOLIC BLOOD PRESSURE: 136 MMHG | OXYGEN SATURATION: 99 %

## 2020-10-13 ENCOUNTER — TREATMENT (OUTPATIENT)
Dept: PHYSICAL THERAPY | Facility: CLINIC | Age: 65
End: 2020-10-13

## 2020-10-13 DIAGNOSIS — M54.50 ACUTE RIGHT-SIDED LOW BACK PAIN WITHOUT SCIATICA: Primary | ICD-10-CM

## 2020-10-13 PROCEDURE — 97110 THERAPEUTIC EXERCISES: CPT | Performed by: PHYSICAL THERAPIST

## 2020-10-13 NOTE — PROGRESS NOTES
Physical Therapy Daily Progress Note    VISIT#: 5/20 in POC, expires 12/19/20  Imaging:  CT 6/29/20:  1/2 anterolisthesis L4 upon L5 secondary to severe facet arthropathy.    Subjective   Tania Jose Raul reports: Doing better, pain has only been 3/10 at the highest in the last week.   Pain 1/10 across low back, more on right.  Pain is coming and going. She is now able to sit for approximately 30-45 minutes without pain.   L shoulder has been bothering her.       Objective     See Exercise, Manual, and Modality Logs for complete treatment.     Patient Education:  Posture, lumbar support/towel roll.  Also,  instruction with pamphlet. See chart. HEP progressed and issued, see chart.     Exercises  Hooklying Single Knee to Chest Stretch - 5 reps - 1 sets - 10 seconds hold - 2x daily - 7x weekly        Assessment/Plan :  All short term goals met as noted.     STGs to be completed in 3 weeks:  -- </= 3 cues with HEP.-MET  -- </= 7/10 pain at worst.-MET  -- Be able to sit >/= 45 min without pain.-MET    LTGs to be completed by end of POC:  -- No cues with HEP for d/c planning.  -- </= 5/10 pain at worst.  -- Oswestry </= 20% disability.  -- Be able to sit >/= 60 min without pain.  -- SLS on yudith LEs >/= 30 sec without UE support.  -- </= 25% limitation with all standing lumbar AROM without pain.  -- Yudith hip/knee MMT 5/5 without pain.  -- Yudith hip IR/ER >/= 40 degrees in sitting without pain.      Progress per Plan of Care            Timed:         Manual Therapy:         mins  79758;     Therapeutic Exercise:   45      mins  09054;     Neuromuscular Rl:        mins  37827;    Therapeutic Activity:          mins  72924;     Gait Training:           mins  37879;     Ultrasound:          mins  13513;    Ionto                                   mins   28893  Self Care                            mins   65227  Canalith Repos                   mins  4209  Aquatic                               mins  28564    Un-Timed:  Electrical Stimulation:         mins  66548 ( );  Dry Needling          mins self-pay  Traction          mins 02961  Low Eval          Mins  50660  Mod Eval          Mins  79078  High Eval                            Mins  82563  Re-Eval                               mins  46513    Timed Treatment: 45     mins   Total Treatment:   45     mins    Sobia Zavala, PTA

## 2020-10-15 ENCOUNTER — HOSPITAL ENCOUNTER (OUTPATIENT)
Dept: PET IMAGING | Facility: HOSPITAL | Age: 65
Discharge: HOME | End: 2020-10-15
Admitting: FAMILY MEDICINE

## 2020-10-15 ENCOUNTER — TREATMENT (OUTPATIENT)
Dept: PHYSICAL THERAPY | Facility: CLINIC | Age: 65
End: 2020-10-15

## 2020-10-15 DIAGNOSIS — Z72.0 TOBACCO ABUSE: ICD-10-CM

## 2020-10-15 DIAGNOSIS — Z87.891 PERSONAL HISTORY OF NICOTINE DEPENDENCE: ICD-10-CM

## 2020-10-15 DIAGNOSIS — M54.50 ACUTE RIGHT-SIDED LOW BACK PAIN WITHOUT SCIATICA: Primary | ICD-10-CM

## 2020-10-15 PROCEDURE — 97530 THERAPEUTIC ACTIVITIES: CPT | Performed by: PHYSICAL THERAPIST

## 2020-10-15 PROCEDURE — 97110 THERAPEUTIC EXERCISES: CPT | Performed by: PHYSICAL THERAPIST

## 2020-10-15 PROCEDURE — G0297 LDCT FOR LUNG CA SCREEN: HCPCS

## 2020-10-15 NOTE — PROGRESS NOTES
Physical Therapy Daily Progress Note    Patient: Tania Blunt  : 1955  Referring practitioner: Arti Aceves MD  Today's Date: 10/15/2020    VISIT#: 6    Subjective   Tania Blunt reports: Says she is doing pretty well overall, says she is about 60% better since starting therapy.       Objective     See Exercise, Manual, and Modality Logs for complete treatment.     Patient Education: progressed HEP    Assessment & Plan     Assessment  Assessment details: Good response to session, no increased pain, improved tolerance to exercises and able to progress reps and exercises. Progressed HEP with good understanding.     Plan  Frequency: 1x week  Plan details: Decrease to 1x/week starting next week.       Progress per Plan of Care            Timed:         Manual Therapy:         mins  04888;     Therapeutic Exercise:    30     mins  72394;     Neuromuscular Rl:        mins  42299;    Therapeutic Activity:     15     mins  25559;     Gait Training:           mins  57417;     Ultrasound:          mins  45184;    Ionto:                                   mins   52167  Self Care:                            mins   61324    Un-Timed:  Electrical Stimulation:         mins  33654 ( );  Dry Needling          mins self-pay  Traction          mins 57341  Re-Eval                               mins  27669    Timed Treatment:   45   mins   Total Treatment:     45   mins    Elda Young PT  Physical Therapist

## 2020-10-20 ENCOUNTER — TREATMENT (OUTPATIENT)
Dept: PHYSICAL THERAPY | Facility: CLINIC | Age: 65
End: 2020-10-20

## 2020-10-20 DIAGNOSIS — M54.50 ACUTE RIGHT-SIDED LOW BACK PAIN WITHOUT SCIATICA: Primary | ICD-10-CM

## 2020-10-20 PROCEDURE — 97530 THERAPEUTIC ACTIVITIES: CPT | Performed by: PHYSICAL THERAPIST

## 2020-10-20 PROCEDURE — 97110 THERAPEUTIC EXERCISES: CPT | Performed by: PHYSICAL THERAPIST

## 2020-10-20 NOTE — PROGRESS NOTES
Physical Therapy Daily Progress Note    Patient: Tania Blunt  : 1955  Referring practitioner: Arti Aceves MD  Today's Date: 10/20/2020    VISIT#: 7    Subjective   Tania Blunt reports: Says she is continuing to do well, still hurts if she sits too long.       Objective     See Exercise, Manual, and Modality Logs for complete treatment.     Patient Education:    Assessment & Plan     Assessment  Assessment details: Good response to session, no increased pain, improved tolerance to exercises and able to progress reps and exercises. Patient had not been performing updated HEP so reprinted and educated patient on new exercises.     Plan  Frequency: 1x week      Progress per Plan of Care          Timed:         Manual Therapy:         mins  87952;     Therapeutic Exercise:    30     mins  71878;     Neuromuscular Rl:        mins  02962;    Therapeutic Activity:     15     mins  54831;     Gait Training:           mins  16794;     Ultrasound:          mins  47500;    Ionto:                                   mins   71977  Self Care:                            mins   96130    Un-Timed:  Electrical Stimulation:         mins  24215 ( );  Dry Needling          mins self-pay  Traction          mins 46067  Re-Eval                               mins  09785    Timed Treatment:   45   mins   Total Treatment:     45   mins    Elda Young PT  Physical Therapist

## 2020-10-21 RX ORDER — HYDRALAZINE HYDROCHLORIDE 25 MG/1
TABLET, FILM COATED ORAL
Qty: 180 TABLET | Refills: 1 | Status: SHIPPED | OUTPATIENT
Start: 2020-10-21 | End: 2021-04-23

## 2020-10-23 RX ORDER — SIMVASTATIN 40 MG
TABLET ORAL
Qty: 90 TABLET | Refills: 3 | Status: SHIPPED | OUTPATIENT
Start: 2020-10-23 | End: 2021-11-10

## 2020-10-23 RX ORDER — SITAGLIPTIN AND METFORMIN HYDROCHLORIDE 1000; 50 MG/1; MG/1
1 TABLET, FILM COATED, EXTENDED RELEASE ORAL 2 TIMES DAILY
Qty: 180 TABLET | Refills: 3 | Status: SHIPPED | OUTPATIENT
Start: 2020-10-23 | End: 2022-02-08

## 2020-10-27 ENCOUNTER — TREATMENT (OUTPATIENT)
Dept: PHYSICAL THERAPY | Facility: CLINIC | Age: 65
End: 2020-10-27

## 2020-10-27 DIAGNOSIS — M54.50 ACUTE RIGHT-SIDED LOW BACK PAIN WITHOUT SCIATICA: Primary | ICD-10-CM

## 2020-10-27 PROCEDURE — 97140 MANUAL THERAPY 1/> REGIONS: CPT | Performed by: PHYSICAL THERAPIST

## 2020-10-27 PROCEDURE — 97110 THERAPEUTIC EXERCISES: CPT | Performed by: PHYSICAL THERAPIST

## 2020-10-27 PROCEDURE — 97530 THERAPEUTIC ACTIVITIES: CPT | Performed by: PHYSICAL THERAPIST

## 2020-10-27 NOTE — PROGRESS NOTES
Physical Therapy Daily Progress Note    Patient: Tania Blunt  : 1955  Referring practitioner: Arti Aceves MD  Today's Date: 10/27/2020    VISIT#: 8    Subjective   Tania Blunt reports: Doing ok, right side has been a little more sore lately. Pain is about 3/10.     Objective     See Exercise, Manual, and Modality Logs for complete treatment.     Patient Education: progressed HEP to include sidelying decompression    Assessment & Plan     Assessment  Assessment details: Very good response to session. Less right sided pain after manual therapy and even less pain after sidelying decompression. Added this to HEP.     Plan  Frequency: 1x week  Plan details: Next time may be her last session depending on how she does after next week.       Progress strengthening /stabilization /functional activity         Timed:         Manual Therapy:    15     mins  55482;     Therapeutic Exercise:    15     mins  81446;     Neuromuscular Rl:        mins  65164;    Therapeutic Activity:     15     mins  19796;     Gait Training:           mins  80306;     Ultrasound:          mins  32416;    Ionto:                                   mins   26480  Self Care:                            mins   51123    Un-Timed:  Electrical Stimulation:         mins  09173 ( );  Dry Needling          mins self-pay  Traction          mins 93524  Re-Eval                               mins  96434    Timed Treatment:   45   mins   Total Treatment:     45   mins    Elda Young PT  Physical Therapist

## 2020-11-03 ENCOUNTER — TREATMENT (OUTPATIENT)
Dept: PHYSICAL THERAPY | Facility: CLINIC | Age: 65
End: 2020-11-03

## 2020-11-03 DIAGNOSIS — M54.50 ACUTE RIGHT-SIDED LOW BACK PAIN WITHOUT SCIATICA: Primary | ICD-10-CM

## 2020-11-03 PROCEDURE — 97530 THERAPEUTIC ACTIVITIES: CPT | Performed by: PHYSICAL THERAPIST

## 2020-11-03 PROCEDURE — 97110 THERAPEUTIC EXERCISES: CPT | Performed by: PHYSICAL THERAPIST

## 2020-11-03 NOTE — PROGRESS NOTES
Physical Therapy Progress Note and Discharge Summary    Patient: Tania Blunt  : 1955  Referring practitioner: Arti Aceves MD  Today's Date: 11/3/2020    VISIT#: 9    Subjective   Tania Blunt reports: Says she is doing ok, says her side is better but still bothers her some. Says she is overall doing great and is ready to make today her last session. Says she is 70% improved since starting therapy. In the last week the worst her pain has been is 3/10.     Oswestry: 28%    Objective          Active Range of Motion     Additional Active Range of Motion Details  Lumbar AROM:  Flexion: min limitation and mild pain  Extension: min limitation and mild pain  Left lateral flexion: no limitation and mild pain        Right lateral flexion: no  limitation and mild pain        See Exercise, Manual, and Modality Logs for complete treatment.     Patient Education: progressed and finalized HEP.     Assessment & Plan     Assessment  Assessment details: Good tolerance to session. Tania has made excellent progress with therapy. She has met most goals and rates herself as 70% improved since starting therapy. She is independent with her home program for self management.     Goals  Plan Goals: STGs to be completed in 3 weeks:  -- </= 3 cues with HEP. MET  -- </= 7/10 pain at worst. MET  -- Be able to sit >/= 45 min without pain. MET    LTGs to be completed by end of POC:  -- No cues with HEP for d/c planning. MET  -- </= 5/10 pain at worst. MET  -- Oswestry </= 20% disability. NOT MET  -- Be able to sit >/= 60 min without pain. PARTIALLY MET (can sit for 1 hour, but has a little pain)  -- SLS on santy LEs >/= 30 sec without UE support. NOT TESTED  -- </= 25% limitation with all standing lumbar AROM without pain. NOT MET (mild pain)  -- Santy hip/knee MMT 5/5 without pain. MET      Plan  Plan details: Discharge with HEP.             Timed:         Manual Therapy:         mins  69967;     Therapeutic Exercise:    25      mins  64069;     Neuromuscular Rl:        mins  62925;    Therapeutic Activity:     15     mins  65160;     Gait Training:           mins  93867;     Ultrasound:          mins  62556;    Ionto:                                   mins   72234  Self Care:                            mins   11866    Un-Timed:  Electrical Stimulation:         mins  61638 ( );  Dry Needling          mins self-pay  Traction          mins 28530  Re-Eval                               mins  70777    Timed Treatment:   40   mins   Total Treatment:     40   mins    Elda Young PT  Physical Therapist

## 2020-12-29 RX ORDER — GABAPENTIN 400 MG/1
CAPSULE ORAL
Qty: 270 CAPSULE | Refills: 1 | Status: SHIPPED | OUTPATIENT
Start: 2020-12-29 | End: 2021-08-20

## 2020-12-30 ENCOUNTER — OFFICE VISIT (OUTPATIENT)
Dept: FAMILY MEDICINE CLINIC | Facility: CLINIC | Age: 65
End: 2020-12-30

## 2020-12-30 VITALS
TEMPERATURE: 97.5 F | BODY MASS INDEX: 31 KG/M2 | DIASTOLIC BLOOD PRESSURE: 81 MMHG | WEIGHT: 175 LBS | SYSTOLIC BLOOD PRESSURE: 167 MMHG | OXYGEN SATURATION: 98 % | HEART RATE: 85 BPM

## 2020-12-30 DIAGNOSIS — L40.9 PSORIASIS: ICD-10-CM

## 2020-12-30 DIAGNOSIS — M25.562 ACUTE PAIN OF LEFT KNEE: ICD-10-CM

## 2020-12-30 DIAGNOSIS — L03.119 CELLULITIS OF FOOT: Primary | ICD-10-CM

## 2020-12-30 DIAGNOSIS — M25.512 ACUTE PAIN OF LEFT SHOULDER: ICD-10-CM

## 2020-12-30 PROCEDURE — 99214 OFFICE O/P EST MOD 30 MIN: CPT | Performed by: FAMILY MEDICINE

## 2020-12-30 RX ORDER — CEPHALEXIN 500 MG/1
500 CAPSULE ORAL 3 TIMES DAILY
Qty: 21 CAPSULE | Refills: 0 | Status: SHIPPED | OUTPATIENT
Start: 2020-12-30 | End: 2021-01-19 | Stop reason: SDUPTHER

## 2020-12-30 NOTE — PROGRESS NOTES
Subjective   Chief Complaint   Patient presents with   • Arm Pain     Lt   • Knee Pain     Lt     Tania Blunt is a 65 y.o. female.     Arm Pain   The incident occurred more than 1 week ago. There was no injury mechanism. The pain is present in the left shoulder. The quality of the pain is described as aching. The pain radiates to the left arm. The pain is at a severity of 5/10. The pain has been constant since the incident. Pertinent negatives include no chest pain, muscle weakness, numbness or tingling. The symptoms are aggravated by movement. She has tried NSAIDs and rest for the symptoms. The treatment provided mild relief.   Knee Pain   The incident occurred 6 to 12 hours ago. The incident occurred at home. There was no injury mechanism. The pain is present in the left knee. The quality of the pain is described as aching. The pain is moderate. The pain has been constant since onset. Pertinent negatives include no inability to bear weight, loss of motion, muscle weakness, numbness or tingling. She reports no foreign bodies present. The symptoms are aggravated by movement and weight bearing. She has tried NSAIDs for the symptoms. The treatment provided mild relief.   Rash  This is a new problem. The current episode started in the past 7 days. The problem has been gradually worsening since onset. The affected locations include the right foot and right toes. The rash is characterized by pain, redness, swelling and draining. She was exposed to nothing. Pertinent negatives include no cough, fever, shortness of breath or sore throat. Past treatments include nothing.      Past Medical History:   Diagnosis Date   • Arthritis    • CAD (coronary artery disease)    • Diabetes (CMS/HCC)    • GERD (gastroesophageal reflux disease)    • Heart disease    • Hypertension    • Hypoxemia      Past Surgical History:   Procedure Laterality Date   • APPENDECTOMY     • CARDIAC CATHETERIZATION  07/29/2015    No Intervention:  Disease noted RCA & Circumflex Artery   • COLONOSCOPY  09/21/2020    Cologuard negative.   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   • TUBAL ABDOMINAL LIGATION Bilateral      No Known Allergies  Social History     Socioeconomic History   • Marital status:      Spouse name: Not on file   • Number of children: Not on file   • Years of education: Not on file   • Highest education level: Not on file   Tobacco Use   • Smoking status: Current Every Day Smoker     Packs/day: 1.00     Years: 50.00     Pack years: 50.00     Types: Cigarettes   • Smokeless tobacco: Never Used   Substance and Sexual Activity   • Alcohol use: No     Frequency: Never     Comment: drinks caffeine free sodas   • Drug use: Not Currently   • Sexual activity: Defer     Social History     Tobacco Use   Smoking Status Current Every Day Smoker   • Packs/day: 1.00   • Years: 50.00   • Pack years: 50.00   • Types: Cigarettes   Smokeless Tobacco Never Used       family history includes Colon cancer in her mother; Heart disease in her maternal grandmother; Hypertension in her mother; Lung cancer in her father.  Current Outpatient Medications on File Prior to Visit   Medication Sig Dispense Refill   • aspirin 81 MG tablet Take 1 tablet by mouth 2 (Two) Times a Day.     • Blood Glucose Monitoring Suppl (ONE TOUCH ULTRA 2) w/Device kit ONETOUCH ULTRA 2 w/Device KIT     • Calcipotriene 0.005 % ointment HAYLEY 5 TO 10 GTS AA ON EACH HAND AND FOOT B APPLYING CLOBETASOL     • clobetasol (TEMOVATE) 0.05 % ointment APPLY TO HANDS AND FEET QD  3   • Dexilant 60 MG capsule TAKE 1 CAPSULE BY MOUTH DAILY 30 capsule 5   • gabapentin (NEURONTIN) 400 MG capsule TAKE 1 CAPSULE BY MOUTH THREE TIMES DAILY FOR NERVE PAIN 270 capsule 1   • glimepiride (AMARYL) 2 MG tablet Take 1 tablet by mouth Daily With Breakfast & Dinner. 180 tablet 1   • glucose blood test strip TEST BLOOD SUGAR TWICE DAILY DUE TO HIGH VARIABLE BLOOD  SUGAR 200 each 5   • hydrALAZINE (APRESOLINE) 25 MG tablet TAKE 1 TABLET BY MOUTH TWICE DAILY 180 tablet 1   • HYDROcodone-acetaminophen (Norco) 7.5-325 MG per tablet Take 1 tablet by mouth Every 6 (Six) Hours As Needed for Moderate Pain . 28 tablet 0   • isosorbide mononitrate (IMDUR) 60 MG 24 hr tablet Take 1 tablet by mouth Daily. 90 tablet 3   • Lancets Thin misc LANCETS THIN     • metoprolol tartrate (LOPRESSOR) 25 MG tablet Take 1 tablet by mouth 2 (Two) Times a Day. 180 tablet 3   • OTEZLA 30 MG tablet Take 30 mg by mouth Daily.     • simvastatin (ZOCOR) 40 MG tablet TAKE 1 TABLET BY MOUTH EVERY EVENING FOR HIGH CHOLESTEROL 90 tablet 3   • SITagliptin-metFORMIN HCl ER (Janumet XR)  MG tablet Take 1 tablet by mouth 2 (Two) Times a Day. 180 tablet 3     No current facility-administered medications on file prior to visit.      Patient Active Problem List   Diagnosis   • Dysuria   • Frequency of urination   • Coronary artery disease   • Disorder of lung   • Encounter for general adult medical examination without abnormal findings   • Family history of malignant neoplasm of colon   • Gastroesophageal reflux disease   • Histoplasmosis   • Hyperkalemia   • Hyperlipidemia   • Hypertension   • Hypoxemia   • Psoriasis   • Sleep apnea   • Type 2 diabetes mellitus without complication (CMS/HCC)   • Degenerative arthritis of thumb, left   • Screen for colon cancer   • Tobacco dependence syndrome   • Acute right-sided low back pain without sciatica   • Right flank pain   • Medicare annual wellness visit, subsequent   • Cellulitis of foot       The following portions of the patient's history were reviewed and updated as appropriate: allergies, current medications, past family history, past medical history, past social history, past surgical history and problem list.    Review of Systems   Constitutional: Negative for chills and fever.   HENT: Negative for sinus pressure, sore throat and swollen glands.    Eyes:  Negative for blurred vision.   Respiratory: Negative for cough, shortness of breath and wheezing.    Cardiovascular: Negative for chest pain and palpitations.   Gastrointestinal: Negative for abdominal pain.   Endocrine: Negative for polyuria.   Genitourinary: Negative for difficulty urinating.   Skin: Positive for rash.   Neurological: Negative for dizziness, tingling, seizures, numbness and headache.   Hematological: Negative for adenopathy.   Psychiatric/Behavioral: Negative for depressed mood.       Objective   /81 (BP Location: Left arm, Patient Position: Sitting, Cuff Size: Adult) Comment: forgot to take med  Pulse 85   Temp 97.5 °F (36.4 °C)   Wt 79.4 kg (175 lb)   SpO2 98%   BMI 31.00 kg/m²   Physical Exam  Constitutional:       General: She is not in acute distress.     Appearance: She is well-developed.   HENT:      Head: Normocephalic.   Eyes:      General: Lids are normal.      Conjunctiva/sclera: Conjunctivae normal.   Neck:      Musculoskeletal: Normal range of motion.      Thyroid: No thyroid mass or thyromegaly.      Trachea: Trachea normal.   Cardiovascular:      Rate and Rhythm: Normal rate and regular rhythm.      Heart sounds: Normal heart sounds.   Pulmonary:      Effort: Pulmonary effort is normal.      Breath sounds: Normal breath sounds.   Abdominal:      Palpations: Abdomen is soft.   Musculoskeletal:      Left shoulder: She exhibits decreased range of motion and tenderness.      Left knee: Tenderness found.   Lymphadenopathy:      Cervical: No cervical adenopathy.   Skin:     General: Skin is warm and dry.      Findings: Rash present. Rash is crusting and macular.      Comments: Right 3rd and 4th toes with redness, swelling and crusting   Neurological:      Mental Status: She is alert and oriented to person, place, and time.   Psychiatric:         Attention and Perception: She is attentive.         Mood and Affect: Mood normal.         Speech: Speech normal.         Behavior:  Behavior normal.         No visits with results within 1 Week(s) from this visit.   Latest known visit with results is:   Office Visit on 09/09/2020   Component Date Value Ref Range Status   • Hemoglobin A1C 09/15/2020 9.5* 3.5 - 5.6 % Final   • Glucose 09/15/2020 122* 65 - 99 mg/dL Final   • BUN 09/15/2020 18  8 - 23 mg/dL Final   • Creatinine 09/15/2020 0.90  0.57 - 1.00 mg/dL Final   • Sodium 09/15/2020 139  136 - 145 mmol/L Final   • Potassium 09/15/2020 4.9  3.5 - 5.2 mmol/L Final   • Chloride 09/15/2020 102  98 - 107 mmol/L Final   • CO2 09/15/2020 27.9  22.0 - 29.0 mmol/L Final   • Calcium 09/15/2020 9.7  8.6 - 10.5 mg/dL Final   • Total Protein 09/15/2020 7.8  6.0 - 8.5 g/dL Final   • Albumin 09/15/2020 4.00  3.50 - 5.20 g/dL Final   • ALT (SGPT) 09/15/2020 10  1 - 33 U/L Final   • AST (SGOT) 09/15/2020 14  1 - 32 U/L Final   • Alkaline Phosphatase 09/15/2020 96  39 - 117 U/L Final   • Total Bilirubin 09/15/2020 0.4  0.0 - 1.2 mg/dL Final   • eGFR Non African Amer 09/15/2020 63  >60 mL/min/1.73 Final   • Globulin 09/15/2020 3.8  gm/dL Final   • A/G Ratio 09/15/2020 1.1  g/dL Final   • BUN/Creatinine Ratio 09/15/2020 20.0  7.0 - 25.0 Final   • Anion Gap 09/15/2020 9.1  5.0 - 15.0 mmol/L Final   • Total Cholesterol 09/15/2020 160  0 - 200 mg/dL Final   • Triglycerides 09/15/2020 246* 0 - 150 mg/dL Final   • HDL Cholesterol 09/15/2020 44  40 - 60 mg/dL Final   • LDL Cholesterol  09/15/2020 67  0 - 100 mg/dL Final   • VLDL Cholesterol 09/15/2020 49.2* 5 - 40 mg/dL Final   • LDL/HDL Ratio 09/15/2020 1.52   Final           Assessment/Plan   Diagnoses and all orders for this visit:    1. Cellulitis of foot (Primary)  Comments:  Right 3rd and 4th toes with secondary infection in an area of inflamed psoriasis  Orders:  -     cephalexin (Keflex) 500 MG capsule; Take 1 capsule by mouth 3 (Three) Times a Day.  Dispense: 21 capsule; Refill: 0    2. Psoriasis    3. Acute pain of left shoulder  Comments:  Concerning for  possible rotator cuff pathology  Orders:  -     MRI Shoulder Left Without Contrast; Future    4. Acute pain of left knee  -     XR Knee 3 View Left; Future

## 2021-01-13 NOTE — TELEPHONE ENCOUNTER
[facility]. Faxed request for Isosorbide Mono 60 mg ER tab qty 180.    Pt is past due for appt.     I will refill but needs appt for further refills.   [2 x 2] : 2 x 2  [0] : left 0 [Varicose Veins Of Lower Extremities] : bilaterally [Ankle Swelling Bilaterally] : severe [Skin Ulcer] : ulcer [Calm] : calm [] : not present [de-identified] : A&Ox3, NAD [de-identified] : 4/5 strength in all quadrants bilaterally, s/p left ankle orif [de-identified] : left lateral ankle ulcer down to skin, subcutaneous tissue, fat [de-identified] : Light touch sensation intact bilaterally [FreeTextEntry1] : Left Lateral Ankle [FreeTextEntry2] : 0.9 [FreeTextEntry3] : 0.7 [FreeTextEntry4] : 0.3 [de-identified] : Serosanguineous [de-identified] : Intact [de-identified] : 90% [de-identified] : Medihoney [de-identified] : Cleansed with Normal Saline\par Cloth Tape [TWNoteComboBox2] : False [TWNoteComboBox4] : Small [TWNoteComboBox5] : No [de-identified] : No [de-identified] : None [de-identified] : None [de-identified] : <20% [de-identified] : Yes [de-identified] : 3x Weekly

## 2021-01-14 ENCOUNTER — HOSPITAL ENCOUNTER (OUTPATIENT)
Dept: MRI IMAGING | Facility: HOSPITAL | Age: 66
Discharge: HOME | End: 2021-01-14
Admitting: FAMILY MEDICINE

## 2021-01-14 DIAGNOSIS — M25.512 ACUTE PAIN OF LEFT SHOULDER: ICD-10-CM

## 2021-01-14 PROCEDURE — 73221 MRI JOINT UPR EXTREM W/O DYE: CPT

## 2021-01-18 ENCOUNTER — TELEPHONE (OUTPATIENT)
Dept: FAMILY MEDICINE CLINIC | Facility: CLINIC | Age: 66
End: 2021-01-18

## 2021-01-18 DIAGNOSIS — M25.512 ACUTE PAIN OF LEFT SHOULDER: Primary | ICD-10-CM

## 2021-01-18 NOTE — TELEPHONE ENCOUNTER
Patient was told to call and get a referral for an orthopedic doctor.  Don't mind which doctor, just wanted someone in [facility].  Please call back once done.    [phone]

## 2021-01-19 DIAGNOSIS — L03.119 CELLULITIS OF FOOT: ICD-10-CM

## 2021-01-19 NOTE — TELEPHONE ENCOUNTER
Caller: Tania Blunt    Relationship: Self    Best call back number: [phone]     Medication needed:   Requested Prescriptions     Pending Prescriptions Disp Refills   • cephalexin (Keflex) 500 MG capsule 21 capsule 0     Sig: Take 1 capsule by mouth 3 (Three) Times a Day.       When do you need the refill by: 01/19/21    What details did the patient provide when requesting the medication: PATIENT STATES THAT THE CELLULITIS ON HER FOOT HAS RETURNED AND IS REQUESTING A REFILL OF THE MEDICATION ORIGINALLY PRESCRIBED TO HER.    Does the patient have less than a 3 day supply:  [x] Yes  [] No    What is the patient's preferred pharmacy: [id number] [place] - [address] AT [address] [phone] [facility] [phone]

## 2021-01-20 RX ORDER — CEPHALEXIN 500 MG/1
500 CAPSULE ORAL 3 TIMES DAILY
Qty: 21 CAPSULE | Refills: 0 | Status: SHIPPED | OUTPATIENT
Start: 2021-01-20 | End: 2021-08-30

## 2021-01-21 ENCOUNTER — OFFICE VISIT (OUTPATIENT)
Dept: ORTHOPEDIC SURGERY | Facility: CLINIC | Age: 66
End: 2021-01-21

## 2021-01-21 VITALS
WEIGHT: 175 LBS | DIASTOLIC BLOOD PRESSURE: 69 MMHG | SYSTOLIC BLOOD PRESSURE: 115 MMHG | HEART RATE: 86 BPM | BODY MASS INDEX: 31.01 KG/M2 | HEIGHT: 63 IN

## 2021-01-21 DIAGNOSIS — M25.512 ACUTE PAIN OF LEFT SHOULDER: Primary | ICD-10-CM

## 2021-01-21 DIAGNOSIS — M75.02 ADHESIVE CAPSULITIS OF LEFT SHOULDER: ICD-10-CM

## 2021-01-21 PROCEDURE — 20610 DRAIN/INJ JOINT/BURSA W/O US: CPT | Performed by: ORTHOPAEDIC SURGERY

## 2021-01-21 PROCEDURE — 99203 OFFICE O/P NEW LOW 30 MIN: CPT | Performed by: ORTHOPAEDIC SURGERY

## 2021-01-21 RX ORDER — TRIAMCINOLONE ACETONIDE 40 MG/ML
80 INJECTION, SUSPENSION INTRA-ARTICULAR; INTRAMUSCULAR ONCE
Status: COMPLETED | OUTPATIENT
Start: 2021-01-21 | End: 2021-01-21

## 2021-01-21 RX ADMIN — TRIAMCINOLONE ACETONIDE 80 MG: 40 INJECTION, SUSPENSION INTRA-ARTICULAR; INTRAMUSCULAR at 14:25

## 2021-01-21 NOTE — PROGRESS NOTES
"     Patient ID: Tania Blunt is a 65 y.o. female.    Chief Complaint:    Chief Complaint   Patient presents with   • Left Shoulder - Consult       HPI:  Tania is a 65-year-old female here with several months of left shoulder pain.  No specific injury.  She has pain over the front of her shoulder which refers to the deltoid.  She has trouble reaching overhead and behind her back and out away from her body at the drive-through.  Pain is sharp and a 6/10 and not much better with conservative treatment at home  Past Medical History:   Diagnosis Date   • Arthritis    • CAD (coronary artery disease)    • Diabetes (CMS/HCC)    • GERD (gastroesophageal reflux disease)    • Heart disease    • Hypertension    • Hypoxemia        Past Surgical History:   Procedure Laterality Date   • APPENDECTOMY     • CARDIAC CATHETERIZATION  07/29/2015    No Intervention: Disease noted RCA & Circumflex Artery   • COLONOSCOPY  09/21/2020    Cologuard negative.   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   • TUBAL ABDOMINAL LIGATION Bilateral        Family History   Problem Relation Age of Onset   • Hypertension Mother    • Colon cancer Mother    • Lung cancer Father    • Heart disease Maternal Grandmother           Social History     Occupational History   • Not on file   Tobacco Use   • Smoking status: Current Every Day Smoker     Packs/day: 1.00     Years: 50.00     Pack years: 50.00     Types: Cigarettes   • Smokeless tobacco: Never Used   Substance and Sexual Activity   • Alcohol use: No     Frequency: Never     Comment: drinks caffeine free sodas   • Drug use: Not Currently   • Sexual activity: Defer      Review of Systems   Cardiovascular: Negative for chest pain.   Musculoskeletal: Positive for arthralgias.       Objective:    /69   Pulse 86   Ht 160 cm (63\")   Wt 79.4 kg (175 lb)   BMI 31.00 kg/m²     Physical Examination:  She is a pleasant female in no distress. She is " alert and oriented x3 and appears her stated age.  Left shoulder demonstrates no scars and no atrophy.  She has mild pain over the bicep groove.  Passive elevation 160 degrees abduction 130 degrees external rotation 20 degrees internal rotation of left hip.  She has mild pain and weakness on Speed, Penning[place], supraspinatus testing.  Belly press and liftoff are 4/5.Sensory and motor exam are intact all distributions. Radial pulse is palpable and capillary refill is less than two seconds to all digits    Imaging:  left Shoulder X-Ray  Indication: Chronic left shoulder pain  AP Y and Lateral views  Findings: Mild degenerative disease  no bony lesion  Soft tissues normal  normal joint spaces  Hardware appropriately positioned not applicable      yes prior studies available for comparison    MRI demonstrates widespread cuff tendinopathy with a small partial-thickness supraspinatus tear and bicep tendinopathy with upper subscapularis tear and capsulitis    Assessment:  Frozen shoulder with bicep tendinopathy and partial cuff tear left side    Plan:  Treatment options discussed, recommend conservative treatment with a home exercise program, and I recommend injection after today's evaluation. Risks and benefits of the injection were discussed. Under sterile technique and written consent I injected 80mg of Kenalog and 2cc of 1% Lidocaine in the shoulder and subacromial space. It was well tolerated  See me in a month      Procedures         Disclaimer: Please note that areas of this note were completed with computer voice recognition software.  Quite often unanticipated grammatical, syntax, homophones, and other interpretive errors are inadvertently transcribed by the computer software. Please excuse any errors that have escaped final proofreading.

## 2021-02-25 ENCOUNTER — OFFICE VISIT (OUTPATIENT)
Dept: ORTHOPEDIC SURGERY | Facility: CLINIC | Age: 66
End: 2021-02-25

## 2021-02-25 VITALS
HEIGHT: 63 IN | HEART RATE: 87 BPM | BODY MASS INDEX: 31.01 KG/M2 | SYSTOLIC BLOOD PRESSURE: 118 MMHG | DIASTOLIC BLOOD PRESSURE: 65 MMHG | WEIGHT: 175 LBS

## 2021-02-25 DIAGNOSIS — M75.02 ADHESIVE CAPSULITIS OF LEFT SHOULDER: Primary | ICD-10-CM

## 2021-02-25 PROCEDURE — 99212 OFFICE O/P EST SF 10 MIN: CPT | Performed by: ORTHOPAEDIC SURGERY

## 2021-02-25 NOTE — PROGRESS NOTES
"     Patient ID: Tania Blunt is a 65 y.o. female.  Left shoulder pain  Jazmine is a 65-year-old female who has frozen shoulder and some widespread partial cuff tearing on the left side.  She had cortisone injection and began exercise program since January 21  Feels about 75 to 80% better    Review of Systems:    Left shoulder pain improving    Objective:    /65   Pulse 87   Ht 160 cm (63\")   Wt 79.4 kg (175 lb)   BMI 31.00 kg/m²     Physical Examination:     Left shoulder demonstrates intact skin.  Passive elevation is 170 degrees abduction 130 degrees external rotation 40 degrees internal rotation L5 with a negative Speed, [place], supraspinatus test    Imaging:       Assessment:    Improving left shoulder partial cuff tear and frozen shoulder    Plan:   Activity as tolerated and see me as needed      Procedures          Disclaimer: Please note that areas of this note were completed with computer voice recognition software.  Quite often unanticipated grammatical, syntax, homophones, and other interpretive errors are inadvertently transcribed by the computer software. Please excuse any errors that have escaped final proofreading.  "

## 2021-03-18 RX ORDER — DEXLANSOPRAZOLE 60 MG/1
CAPSULE, DELAYED RELEASE ORAL
Qty: 30 CAPSULE | Refills: 5 | Status: SHIPPED | OUTPATIENT
Start: 2021-03-18 | End: 2021-09-20

## 2021-04-13 ENCOUNTER — LAB (OUTPATIENT)
Dept: FAMILY MEDICINE CLINIC | Facility: CLINIC | Age: 66
End: 2021-04-13

## 2021-04-13 DIAGNOSIS — E78.2 MIXED HYPERLIPIDEMIA: Primary | ICD-10-CM

## 2021-04-13 LAB
ALBUMIN SERPL-MCNC: 3.9 G/DL (ref 3.5–5.2)
ALBUMIN/GLOB SERPL: 1.2 G/DL
ALP SERPL-CCNC: 85 U/L (ref 39–117)
ALT SERPL W P-5'-P-CCNC: 9 U/L (ref 1–33)
ANION GAP SERPL CALCULATED.3IONS-SCNC: 12.5 MMOL/L (ref 5–15)
AST SERPL-CCNC: 18 U/L (ref 1–32)
BILIRUB SERPL-MCNC: 0.2 MG/DL (ref 0–1.2)
BUN SERPL-MCNC: 22 MG/DL (ref 8–23)
BUN/CREAT SERPL: 21.8 (ref 7–25)
CALCIUM SPEC-SCNC: 9 MG/DL (ref 8.6–10.5)
CHLORIDE SERPL-SCNC: 99 MMOL/L (ref 98–107)
CHOLEST SERPL-MCNC: 146 MG/DL (ref 0–200)
CK SERPL-CCNC: 80 U/L (ref 20–180)
CO2 SERPL-SCNC: 23.5 MMOL/L (ref 22–29)
CREAT SERPL-MCNC: 1.01 MG/DL (ref 0.57–1)
GFR SERPL CREATININE-BSD FRML MDRD: 55 ML/MIN/1.73
GLOBULIN UR ELPH-MCNC: 3.2 GM/DL
GLUCOSE SERPL-MCNC: 148 MG/DL (ref 65–99)
HDLC SERPL-MCNC: 41 MG/DL (ref 40–60)
LDLC SERPL CALC-MCNC: 49 MG/DL (ref 0–100)
LDLC/HDLC SERPL: 0.74 {RATIO}
POTASSIUM SERPL-SCNC: 4.7 MMOL/L (ref 3.5–5.2)
PROT SERPL-MCNC: 7.1 G/DL (ref 6–8.5)
SODIUM SERPL-SCNC: 135 MMOL/L (ref 136–145)
TRIGL SERPL-MCNC: 374 MG/DL (ref 0–150)
VLDLC SERPL-MCNC: 56 MG/DL (ref 5–40)

## 2021-04-13 PROCEDURE — 82550 ASSAY OF CK (CPK): CPT | Performed by: INTERNAL MEDICINE

## 2021-04-13 PROCEDURE — 80053 COMPREHEN METABOLIC PANEL: CPT | Performed by: INTERNAL MEDICINE

## 2021-04-13 PROCEDURE — 36415 COLL VENOUS BLD VENIPUNCTURE: CPT

## 2021-04-13 PROCEDURE — 80061 LIPID PANEL: CPT | Performed by: INTERNAL MEDICINE

## 2021-04-16 ENCOUNTER — OFFICE VISIT (OUTPATIENT)
Dept: CARDIOLOGY | Facility: CLINIC | Age: 66
End: 2021-04-16

## 2021-04-16 VITALS
HEIGHT: 63 IN | TEMPERATURE: 97.5 F | HEART RATE: 83 BPM | BODY MASS INDEX: 31.36 KG/M2 | SYSTOLIC BLOOD PRESSURE: 128 MMHG | DIASTOLIC BLOOD PRESSURE: 76 MMHG | OXYGEN SATURATION: 96 % | WEIGHT: 177 LBS

## 2021-04-16 DIAGNOSIS — E78.2 MIXED HYPERLIPIDEMIA: ICD-10-CM

## 2021-04-16 DIAGNOSIS — E11.9 TYPE 2 DIABETES MELLITUS WITHOUT COMPLICATION, WITHOUT LONG-TERM CURRENT USE OF INSULIN (HCC): ICD-10-CM

## 2021-04-16 DIAGNOSIS — I10 ESSENTIAL HYPERTENSION: ICD-10-CM

## 2021-04-16 DIAGNOSIS — G47.33 OBSTRUCTIVE SLEEP APNEA SYNDROME: ICD-10-CM

## 2021-04-16 DIAGNOSIS — F17.200 TOBACCO DEPENDENCE SYNDROME: ICD-10-CM

## 2021-04-16 DIAGNOSIS — I25.10 CORONARY ARTERY DISEASE INVOLVING NATIVE CORONARY ARTERY OF NATIVE HEART WITHOUT ANGINA PECTORIS: Primary | ICD-10-CM

## 2021-04-16 PROCEDURE — 99214 OFFICE O/P EST MOD 30 MIN: CPT | Performed by: INTERNAL MEDICINE

## 2021-04-16 RX ORDER — CHLORAL HYDRATE 500 MG
CAPSULE ORAL
COMMUNITY

## 2021-04-16 NOTE — PROGRESS NOTES
Subjective:     Encounter Date:04/16/2021      Patient ID: Tania Blunt is a 65 y.o. female.    Chief Complaint: Coronary Artery Disease  History of Present Illness     65-year-old white female patient with a known history of CAD on medical treatment comes back for followup. patient had cardiac catheterization done April 2013,  showed up to 50% ostial RCA disease and  40% ostial circumflex artery disease.         Patient underwent repeat cardiac catheterization due to abnormal stress test in july 2015,  which showed moderate to severe right coronary artery  disease noted.  Mild to moderate circumflex artery disease noted.           Patient is doing much better now without any symptoms of chest pain  Patient was strongly advised to stop smoking     Patient recent labs blood sugar 148 triglycerides 374 advised patient to take fish oil needs aggressive control of blood sugar    I again advised her to control the sleep apnea aggressively to prevent cardiac arrhythmia    Patient was advised to quit smoking     previously I stopped the lisinopril and started on hydralazine     Follow-up in the next 6 months with labs EKG    The following portions of the patient's history were reviewed and updated as appropriate: Allergies current medications past family history past medical history past social history past surgical history problem list and review of systems  Past Medical History:   Diagnosis Date   • Arthritis    • CAD (coronary artery disease)    • Diabetes (CMS/HCC)    • GERD (gastroesophageal reflux disease)    • Heart disease    • Hypertension    • Hypoxemia      Past Surgical History:   Procedure Laterality Date   • APPENDECTOMY     • CARDIAC CATHETERIZATION  07/29/2015    No Intervention: Disease noted RCA & Circumflex Artery   • COLONOSCOPY  09/21/2020    Cologuard negative.   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   • TUBAL ABDOMINAL LIGATION Bilateral   "    /76 (BP Location: Left arm, Patient Position: Sitting, Cuff Size: Adult)   Pulse 83   Temp 97.5 °F (36.4 °C) (Infrared)   Ht 160 cm (63\")   Wt 80.3 kg (177 lb)   SpO2 96%   BMI 31.35 kg/m²   Family History   Problem Relation Age of Onset   • Hypertension Mother    • Colon cancer Mother    • Lung cancer Father    • Heart disease Maternal Grandmother        Current Outpatient Medications:   •  aspirin 81 MG tablet, Take 1 tablet by mouth 2 (Two) Times a Day., Disp: , Rfl:   •  Blood Glucose Monitoring Suppl (ONE TOUCH ULTRA 2) w/Device kit, ONETOUCH ULTRA 2 w/Device KIT, Disp: , Rfl:   •  Calcipotriene 0.005 % ointment, HAYLEY 5 TO 10 GTS AA ON EACH HAND AND FOOT B APPLYING CLOBETASOL, Disp: , Rfl:   •  cephalexin (Keflex) 500 MG capsule, Take 1 capsule by mouth 3 (Three) Times a Day., Disp: 21 capsule, Rfl: 0  •  clobetasol (TEMOVATE) 0.05 % ointment, APPLY TO HANDS AND FEET QD, Disp: , Rfl: 3  •  Dexilant 60 MG capsule, TAKE 1 CAPSULE BY MOUTH DAILY, Disp: 30 capsule, Rfl: 5  •  gabapentin (NEURONTIN) 400 MG capsule, TAKE 1 CAPSULE BY MOUTH THREE TIMES DAILY FOR NERVE PAIN, Disp: 270 capsule, Rfl: 1  •  glimepiride (AMARYL) 2 MG tablet, Take 1 tablet by mouth Daily With Breakfast & Dinner., Disp: 180 tablet, Rfl: 1  •  glucose blood test strip, TEST BLOOD SUGAR TWICE DAILY DUE TO HIGH VARIABLE BLOOD SUGAR, Disp: 200 each, Rfl: 5  •  hydrALAZINE (APRESOLINE) 25 MG tablet, TAKE 1 TABLET BY MOUTH TWICE DAILY, Disp: 180 tablet, Rfl: 1  •  HYDROcodone-acetaminophen (Norco) 7.5-325 MG per tablet, Take 1 tablet by mouth Every 6 (Six) Hours As Needed for Moderate Pain ., Disp: 28 tablet, Rfl: 0  •  isosorbide mononitrate (IMDUR) 60 MG 24 hr tablet, Take 1 tablet by mouth Daily., Disp: 90 tablet, Rfl: 3  •  Lancets Thin misc, LANCETS THIN, Disp: , Rfl:   •  metoprolol tartrate (LOPRESSOR) 25 MG tablet, Take 1 tablet by mouth 2 (Two) Times a Day., Disp: 180 tablet, Rfl: 3  •  Omega-3 Fatty Acids (fish oil) 1000 " MG capsule capsule, Take  by mouth Daily With Breakfast., Disp: , Rfl:   •  OTEZLA 30 MG tablet, Take 30 mg by mouth Daily., Disp: , Rfl:   •  simvastatin (ZOCOR) 40 MG tablet, TAKE 1 TABLET BY MOUTH EVERY EVENING FOR HIGH CHOLESTEROL, Disp: 90 tablet, Rfl: 3  •  SITagliptin-metFORMIN HCl ER (Janumet XR)  MG tablet, Take 1 tablet by mouth 2 (Two) Times a Day., Disp: 180 tablet, Rfl: 3  Social History     Socioeconomic History   • Marital status:      Spouse name: Not on file   • Number of children: Not on file   • Years of education: Not on file   • Highest education level: Not on file   Tobacco Use   • Smoking status: Current Every Day Smoker     Packs/day: 1.00     Years: 50.00     Pack years: 50.00     Types: Cigarettes   • Smokeless tobacco: Never Used   Vaping Use   • Vaping Use: Never used   Substance and Sexual Activity   • Alcohol use: No     Comment: drinks caffeine free sodas   • Drug use: Not Currently   • Sexual activity: Defer     No Known Allergies  Review of Systems   Constitutional: Negative for fever and malaise/fatigue.   HENT: Negative for congestion and hearing loss.    Eyes: Negative for double vision and visual disturbance.   Cardiovascular: Negative for chest pain, claudication, dyspnea on exertion, leg swelling and syncope.   Respiratory: Negative for cough and shortness of breath.    Endocrine: Negative for cold intolerance.   Skin: Negative for color change and rash.   Musculoskeletal: Negative for arthritis and joint pain.   Gastrointestinal: Negative for abdominal pain and heartburn.   Genitourinary: Negative for hematuria.   Neurological: Negative for excessive daytime sleepiness and dizziness.   Psychiatric/Behavioral: Negative for depression. The patient is not nervous/anxious.    All other systems reviewed and are negative.             Objective:     Physical Exam  Vital stable neck no JVP elevation lungs bilateral mostly clear heart sounds S1-S2 regular extremities no  edema bilateral pulses present equal  Procedures    Lab Review:       Assessment:          Diagnosis Plan   1. Coronary artery disease involving native coronary artery of native heart without angina pectoris  CK    Comprehensive Metabolic Panel    Lipid Panel   2. Mixed hyperlipidemia  CK    Comprehensive Metabolic Panel    Lipid Panel   3. Essential hypertension  CK    Comprehensive Metabolic Panel    Lipid Panel   4. Type 2 diabetes mellitus without complication, without long-term current use of insulin (CMS/HCC)  CK    Comprehensive Metabolic Panel    Lipid Panel   5. Tobacco dependence syndrome  CK    Comprehensive Metabolic Panel    Lipid Panel   6. Obstructive sleep apnea syndrome  CK    Comprehensive Metabolic Panel    Lipid Panel          Plan:       MDM  Number of Diagnoses or Management Options  Coronary artery disease involving native coronary artery of native heart without angina pectoris: established, improving  Essential hypertension: established, improving  Mixed hyperlipidemia: established, worsening  Obstructive sleep apnea syndrome: established, worsening  Tobacco dependence syndrome: established, worsening  Type 2 diabetes mellitus without complication, without long-term current use of insulin (CMS/HCC): established, worsening     Amount and/or Complexity of Data Reviewed  Clinical lab tests: ordered and reviewed  Review and summarize past medical records: yes    Risk of Complications, Morbidity, and/or Mortality  Presenting problems: moderate  Management options: moderate    Patient Progress  Patient progress: other (comment)

## 2021-04-23 RX ORDER — CARVEDILOL 25 MG/1
TABLET, FILM COATED ORAL
Qty: 200 EACH | Refills: 1 | Status: SHIPPED | OUTPATIENT
Start: 2021-04-23 | End: 2021-10-30

## 2021-04-23 RX ORDER — HYDRALAZINE HYDROCHLORIDE 25 MG/1
TABLET, FILM COATED ORAL
Qty: 180 TABLET | Refills: 1 | Status: SHIPPED | OUTPATIENT
Start: 2021-04-23 | End: 2021-10-26

## 2021-08-10 RX ORDER — GLIMEPIRIDE 2 MG/1
TABLET ORAL
Qty: 180 TABLET | Refills: 0 | Status: SHIPPED | OUTPATIENT
Start: 2021-08-10 | End: 2023-02-13

## 2021-08-10 NOTE — TELEPHONE ENCOUNTER
I refilled her glimepiride as requested by her pharmacy but she is overdue for a follow up on her diabetes.  She needs to schedule an appointment and labs with me.

## 2021-08-20 RX ORDER — GABAPENTIN 400 MG/1
CAPSULE ORAL
Qty: 270 CAPSULE | Refills: 1 | Status: SHIPPED | OUTPATIENT
Start: 2021-08-20 | End: 2022-03-21

## 2021-08-30 ENCOUNTER — OFFICE VISIT (OUTPATIENT)
Dept: FAMILY MEDICINE CLINIC | Facility: CLINIC | Age: 66
End: 2021-08-30

## 2021-08-30 VITALS
HEIGHT: 63 IN | TEMPERATURE: 97.7 F | HEART RATE: 78 BPM | RESPIRATION RATE: 17 BRPM | BODY MASS INDEX: 31.18 KG/M2 | DIASTOLIC BLOOD PRESSURE: 64 MMHG | WEIGHT: 176 LBS | SYSTOLIC BLOOD PRESSURE: 149 MMHG | OXYGEN SATURATION: 97 %

## 2021-08-30 DIAGNOSIS — M54.50 ACUTE RIGHT-SIDED LOW BACK PAIN WITHOUT SCIATICA: ICD-10-CM

## 2021-08-30 DIAGNOSIS — M54.2 NECK PAIN: Primary | ICD-10-CM

## 2021-08-30 PROCEDURE — 99213 OFFICE O/P EST LOW 20 MIN: CPT | Performed by: FAMILY MEDICINE

## 2021-08-30 RX ORDER — HYDROCODONE BITARTRATE AND ACETAMINOPHEN 7.5; 325 MG/1; MG/1
1 TABLET ORAL EVERY 6 HOURS PRN
Qty: 28 TABLET | Refills: 0 | Status: SHIPPED | OUTPATIENT
Start: 2021-08-30

## 2021-08-30 NOTE — PROGRESS NOTES
"Chief Complaint  Neck Pain    Subjective          Tania Blunt presents to [facility]  Neck Pain   This is a new problem. The current episode started in the past 7 days. The problem occurs constantly. The problem has been gradually improving. The pain is associated with nothing. The pain is present in the right side. The quality of the pain is described as aching. The pain is moderate. The symptoms are aggravated by twisting. She has tried NSAIDs for the symptoms. The treatment provided mild relief.       Objective   Vital Signs:   /64   Pulse 78   Temp 97.7 °F (36.5 °C)   Resp 17   Ht 160 cm (63\")   Wt 79.8 kg (176 lb)   SpO2 97%   BMI 31.18 kg/m²     Physical Exam  Vitals and nursing note reviewed.   HENT:      Head: Normocephalic and atraumatic.   Cardiovascular:      Rate and Rhythm: Normal rate and regular rhythm.      Heart sounds: Normal heart sounds.   Pulmonary:      Effort: Pulmonary effort is normal.      Breath sounds: Normal breath sounds.   Musculoskeletal:      Cervical back: Tenderness present.   Neurological:      General: No focal deficit present.   Psychiatric:         Mood and Affect: Mood normal.        Result Review :   The following data was reviewed by: Arti Aceves MD on 08/30/2021:  Common labs    Common Labsle 9/15/20 9/15/20 9/15/20 4/13/21 4/13/21    0919 0919 0919 0815 0815   Glucose  122 (A)   148 (A)   BUN  18   22   Creatinine  0.90   1.01 (A)   eGFR Non  Am  63   55 (A)   Sodium  139   135 (A)   Potassium  4.9   4.7   Chloride  102   99   Calcium  9.7   9.0   Albumin  4.00   3.90   Total Bilirubin  0.4   0.2   Alkaline Phosphatase  96   85   AST (SGOT)  14   18   ALT (SGPT)  10   9   Total Cholesterol   160 146    Triglycerides   246 (A) 374 (A)    HDL Cholesterol   44 41    LDL Cholesterol    67 49    Hemoglobin A1C 9.5 (A)       (A) Abnormal value                      Assessment and Plan    Diagnoses and all orders for " this visit:    1. Neck pain (Primary)    2. Acute right-sided low back pain without sciatica  -     HYDROcodone-acetaminophen (Norco) 7.5-325 MG per tablet; Take 1 tablet by mouth Every 6 (Six) Hours As Needed for Moderate Pain .  Dispense: 28 tablet; Refill: 0        Follow Up   No follow-ups on file.  Patient was given instructions and counseling regarding her condition or for health maintenance advice. Please see specific information pulled into the AVS if appropriate.

## 2021-08-31 ENCOUNTER — TELEPHONE (OUTPATIENT)
Dept: FAMILY MEDICINE CLINIC | Facility: CLINIC | Age: 66
End: 2021-08-31

## 2021-08-31 NOTE — TELEPHONE ENCOUNTER
Caller: Tania Blunt    Relationship: Self    Best call back number:     What is the best time to reach you: ANYTIME    Who are you requesting to speak with (clinical staff, provider,  specific staff member):     Do you know the name of the person who called:     What was the call regarding: PATIENT IS CALLING IN STATING THAT DR RICO WAS TO CALL HER IN A PAIN MEDICATION.  PATIENT CALLED THE PHARMACY LAST NIGHT AND THEY DONT HAVE ANYTHING FOR HER.  SHE WANTS TO KNOW IF THE MEDICATION WAS CALLED IN FOR HER OR NOT.      Do you require a callback: YES

## 2021-09-20 RX ORDER — DEXLANSOPRAZOLE 60 MG/1
CAPSULE, DELAYED RELEASE ORAL
Qty: 30 CAPSULE | Refills: 5 | Status: SHIPPED | OUTPATIENT
Start: 2021-09-20 | End: 2022-03-21

## 2021-10-26 RX ORDER — HYDRALAZINE HYDROCHLORIDE 25 MG/1
TABLET, FILM COATED ORAL
Qty: 180 TABLET | Refills: 1 | Status: SHIPPED | OUTPATIENT
Start: 2021-10-26 | End: 2022-04-25

## 2021-10-30 RX ORDER — CARVEDILOL 25 MG/1
TABLET, FILM COATED ORAL
Qty: 200 EACH | Refills: 5 | Status: SHIPPED | OUTPATIENT
Start: 2021-10-30 | End: 2022-05-25 | Stop reason: SDUPTHER

## 2021-11-09 ENCOUNTER — OFFICE VISIT (OUTPATIENT)
Dept: CARDIOLOGY | Facility: CLINIC | Age: 66
End: 2021-11-09

## 2021-11-09 VITALS
OXYGEN SATURATION: 97 % | BODY MASS INDEX: 30.65 KG/M2 | HEART RATE: 79 BPM | WEIGHT: 173 LBS | HEIGHT: 63 IN | DIASTOLIC BLOOD PRESSURE: 78 MMHG | SYSTOLIC BLOOD PRESSURE: 137 MMHG

## 2021-11-09 DIAGNOSIS — I25.10 CORONARY ARTERY DISEASE INVOLVING NATIVE CORONARY ARTERY OF NATIVE HEART WITHOUT ANGINA PECTORIS: Primary | ICD-10-CM

## 2021-11-09 DIAGNOSIS — E11.65 TYPE 2 DIABETES MELLITUS WITH HYPERGLYCEMIA, WITHOUT LONG-TERM CURRENT USE OF INSULIN (HCC): ICD-10-CM

## 2021-11-09 DIAGNOSIS — E78.5 DYSLIPIDEMIA: ICD-10-CM

## 2021-11-09 DIAGNOSIS — I10 ESSENTIAL HYPERTENSION: ICD-10-CM

## 2021-11-09 DIAGNOSIS — E66.9 OBESITY (BMI 30-39.9): ICD-10-CM

## 2021-11-09 PROCEDURE — 99214 OFFICE O/P EST MOD 30 MIN: CPT | Performed by: INTERNAL MEDICINE

## 2021-11-09 PROCEDURE — 93000 ELECTROCARDIOGRAM COMPLETE: CPT | Performed by: INTERNAL MEDICINE

## 2021-11-09 NOTE — PROGRESS NOTES
Subjective:     Encounter Date:11/09/2021      Patient ID: Tania Blunt is a 66 y.o. female.    Chief Complaint : Follow-up for CAD, hypertension, dyslipidemia, diabetes  History of Present Illness      This is a 66-year-old with PMH of  CAD, cardiac cath 4/2013 revealing 50% ostial RCA and 40% ostial LCx disease.  Hypertension  Hyperlipidemia  Diabetes  BIJU  Psoriasis  Histoplasmosis  Cholecystectomy and tubal ligation  Family history of heart disease in maternal grandmother  Cigarette smoker    Here to establish cardiac care.  Patient denies any chest pain or shortness of breath.  Patient's arterial blood pressure is 137/78, heart rate 79, O2 sat of 97% on room air.  BMI is over 30.  Labs from 4/13/2021 reveal CMP with a creatinine of 1.01 GFR 55 glucose 148.  Cholesterol 146 triglycerides 374 HDL 41 LDL 49.  A1c from 9/15/2020 was 9.5.      Assessment :    CAD  Hypertension  Dyslipidemia  Diabetes  BIJU  Obesity BMI over 30    Recommendations and plans :    Reviewed EKG results with patient.  Continue medical management with aspirin, metoprolol, isosorbide, hydralazine, simvastatin to help with hypertension CAD dyslipidemia as tolerated.  Reviewed BMI over 30 counseled on weight loss diet and exercise.  Follow-up with PMD for diabetes care.  Discussed about COVID-19 vaccination patient already took all 3 doses.  Counseled on smoking cessation.          ECG 12 Lead    Date/Time: 11/9/2021 8:08 PM  Performed by: Stanton Reddy MD  Authorized by: Stanton Reddy MD   Comparison: compared with previous ECG from 10/9/2020  Comparison to previous ECG: EKG done today reviewed/interpreted by me reveals sinus rhythm with rate of 84 bpm, no new change compared EKG from 10/9/2020              The following portions of the patient's history were reviewed and updated as appropriate: allergies, current medications, past family history, past medical history, past social history, past surgical history  and problem list.    Assessment:         [facility]     Diagnosis Plan   1. Coronary artery disease involving native coronary artery of native heart without angina pectoris     2. Essential hypertension     3. Dyslipidemia     4. Obesity (BMI 30-39.9)     5. Type 2 diabetes mellitus with hyperglycemia, without long-term current use of insulin (HCC)            Plan:               Past Medical History:  Past Medical History:   Diagnosis Date   • Arthritis    • CAD (coronary artery disease)    • Diabetes (HCC)    • GERD (gastroesophageal reflux disease)    • Heart disease    • Hypertension    • Hypoxemia      Past Surgical History:  Past Surgical History:   Procedure Laterality Date   • APPENDECTOMY     • CARDIAC CATHETERIZATION  07/29/2015    No Intervention: Disease noted RCA & Circumflex Artery   • COLONOSCOPY  09/21/2020    Cologuard negative.   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   • TUBAL ABDOMINAL LIGATION Bilateral       Allergies:  No Known Allergies  Home Meds:  Current Meds:     Current Outpatient Medications:   •  aspirin 81 MG tablet, Take 1 tablet by mouth 2 (Two) Times a Day., Disp: , Rfl:   •  Dexilant 60 MG capsule, TAKE 1 CAPSULE BY MOUTH DAILY, Disp: 30 capsule, Rfl: 5  •  gabapentin (NEURONTIN) 400 MG capsule, TAKE 1 CAPSULE BY MOUTH THREE TIMES DAILY FOR NERVE PAIN (Patient taking differently: 3 (Three) Times a Day.), Disp: 270 capsule, Rfl: 1  •  glimepiride (AMARYL) 2 MG tablet, TAKE 1 TABLET BY MOUTH TWICE DAILY WITH BREAKFAST AND DINNER, Disp: 180 tablet, Rfl: 0  •  hydrALAZINE (APRESOLINE) 25 MG tablet, TAKE 1 TABLET BY MOUTH TWICE DAILY, Disp: 180 tablet, Rfl: 1  •  HYDROcodone-acetaminophen (Norco) 7.5-325 MG per tablet, Take 1 tablet by mouth Every 6 (Six) Hours As Needed for Moderate Pain ., Disp: 28 tablet, Rfl: 0  •  isosorbide mononitrate (IMDUR) 60 MG 24 hr tablet, Take 1 tablet by mouth Daily., Disp: 90 tablet, Rfl: 3  •  metoprolol  "tartrate (LOPRESSOR) 25 MG tablet, Take 1 tablet by mouth 2 (Two) Times a Day., Disp: 180 tablet, Rfl: 3  •  Omega-3 Fatty Acids (fish oil) 1000 MG capsule capsule, Take  by mouth Daily With Breakfast., Disp: , Rfl:   •  OTEZLA 30 MG tablet, Take 30 mg by mouth Daily., Disp: , Rfl:   •  SITagliptin-metFORMIN HCl ER (Janumet XR)  MG tablet, Take 1 tablet by mouth 2 (Two) Times a Day., Disp: 180 tablet, Rfl: 3  •  Blood Glucose Monitoring Suppl (ONE TOUCH ULTRA 2) w/Device kit, ONETOUCH ULTRA 2 w/Device KIT, Disp: , Rfl:   •  Contour Test test strip, TEST BLOOD SUGAR TWICE DAILY AS DIRECTED DUE TO HIGH VARIABLE BLOOD SUGAR, Disp: 200 each, Rfl: 5  •  Lancets Thin misc, LANCETS THIN, Disp: , Rfl:   •  simvastatin (ZOCOR) 40 MG tablet, TAKE 1 TABLET BY MOUTH EVERY EVENING FOR HIGH CHOLESTEROL, Disp: 90 tablet, Rfl: 3  Social History:   Social History     Tobacco Use   • Smoking status: Current Every Day Smoker     Packs/day: 1.00     Years: 50.00     Pack years: 50.00     Types: Cigarettes   • Smokeless tobacco: Never Used   Substance Use Topics   • Alcohol use: No     Comment: drinks caffeine free sodas      Family History:  Family History   Problem Relation Age of Onset   • Hypertension Mother    • Colon cancer Mother    • Lung cancer Father    • Heart disease Maternal Grandmother               Review of Systems   Constitutional: Negative for malaise/fatigue.   Cardiovascular: Negative for chest pain, leg swelling and palpitations.   Respiratory: Negative for shortness of breath.    Skin: Negative for rash.   Neurological: Negative for dizziness, light-headedness and numbness.     All other systems are negative         Objective:     Physical Exam  /78   Pulse 79   Ht 160 cm (63\")   Wt 78.5 kg (173 lb)   SpO2 97%   BMI 30.65 kg/m²   General:  Appears in no acute distress  Eyes: Sclera is anicteric,  conjunctiva is clear   HEENT:  No JVD.  No carotid bruits  Respiratory: Respirations regular and " unlabored at rest.  Clear to auscultation  Cardiovascular: S1,S2 Regular rate and rhythm. No murmur, rub or gallop auscultated.   Extremities: No digital clubbing or cyanosis, no edema  Skin: Color pink. Skin warm and dry to touch. No rashes  No xanthoma  Neuro: Alert and awake, no lateralizing deficits appreciated    Lab Reviewed:         Stanton Reddy MD  11/23/2021 20:12 EST      Much of the above report is an electronic transcription/translation of the spoken language to printed text using Dragon Software. As such, the subtleties and finesse of the spoken language may permit erroneous, or at times, nonsensical words or phrases to be inadvertently transcribed; thus changes may be made at a later date to rectify these errors.

## 2021-11-10 RX ORDER — SIMVASTATIN 40 MG
TABLET ORAL
Qty: 90 TABLET | Refills: 3 | Status: SHIPPED | OUTPATIENT
Start: 2021-11-10 | End: 2022-11-02

## 2021-12-10 ENCOUNTER — TELEPHONE (OUTPATIENT)
Dept: FAMILY MEDICINE CLINIC | Facility: CLINIC | Age: 66
End: 2021-12-10

## 2021-12-10 RX ORDER — ALBUTEROL SULFATE 90 UG/1
2 AEROSOL, METERED RESPIRATORY (INHALATION) EVERY 4 HOURS PRN
Qty: 18 G | Refills: 1 | Status: SHIPPED | OUTPATIENT
Start: 2021-12-10 | End: 2023-02-13

## 2021-12-10 NOTE — TELEPHONE ENCOUNTER
Caller: Tania Blunt    Relationship: Self    Best call back number: [phone]    What medication are you requesting: INHALER    What are your current symptoms: PERSISTENT COUGH NOT PRODUCTIVE    How long have you been experiencing symptoms: 8 DAYS    Have you had these symptoms before:    [x] Yes  [] No    Have you been treated for these symptoms before:   [x] Yes  [] No    If a prescription is needed, what is your preferred pharmacy and phone number:      Additional notes: PATIENT STATES SHE TOOK 2 AT HOME COVID TEST, BOTH NEGATIVE. TRIED TO GET AN APPOINTMENT NOTHING UNTIL Monday AND IS WONDERING IF AN INHALER CAN BE CALLED IN. PATIENT DENIED TELEHEALTH DUE TO NOT HAVING A DEVICE WITH ACCESS.

## 2021-12-13 RX ORDER — ISOSORBIDE MONONITRATE 60 MG/1
60 TABLET, EXTENDED RELEASE ORAL EVERY 24 HOURS
Qty: 90 TABLET | Refills: 3 | Status: SHIPPED | OUTPATIENT
Start: 2021-12-13 | End: 2022-12-27

## 2021-12-13 NOTE — TELEPHONE ENCOUNTER
Rx Refill Note  Requested Prescriptions      No prescriptions requested or ordered in this encounter      Last office visit with prescribing clinician: 11/9/2021      Next office visit with prescribing clinician: 11/10/2022            Keely Zaragoza MA  12/13/21, 13:34 EST

## 2022-02-08 RX ORDER — SITAGLIPTIN AND METFORMIN HYDROCHLORIDE 1000; 50 MG/1; MG/1
TABLET, FILM COATED, EXTENDED RELEASE ORAL
Qty: 180 TABLET | Refills: 0 | Status: SHIPPED | OUTPATIENT
Start: 2022-02-08 | End: 2022-05-25

## 2022-02-16 ENCOUNTER — OFFICE VISIT (OUTPATIENT)
Dept: FAMILY MEDICINE CLINIC | Facility: CLINIC | Age: 67
End: 2022-02-16

## 2022-02-16 VITALS
SYSTOLIC BLOOD PRESSURE: 148 MMHG | HEART RATE: 81 BPM | TEMPERATURE: 97.7 F | WEIGHT: 174 LBS | DIASTOLIC BLOOD PRESSURE: 82 MMHG | BODY MASS INDEX: 30.82 KG/M2 | OXYGEN SATURATION: 97 %

## 2022-02-16 DIAGNOSIS — M76.62 ACHILLES TENDINITIS OF LEFT LOWER EXTREMITY: Primary | ICD-10-CM

## 2022-02-16 PROCEDURE — 99213 OFFICE O/P EST LOW 20 MIN: CPT | Performed by: FAMILY MEDICINE

## 2022-02-16 RX ORDER — TRIAMCINOLONE ACETONIDE 1 MG/G
CREAM TOPICAL
COMMUNITY
Start: 2022-01-05 | End: 2023-02-13

## 2022-02-16 NOTE — PATIENT INSTRUCTIONS
"[employer]'s Emergency Medicine: Concepts and Clinical Practice (9th ed., pp. 6635-9082). [place], [place]: [employer] Retrieved from [email or web address]">   Achilles Tendinitis    Achilles tendinitis is inflammation of the tough, cord-like band that attaches the lower leg muscles to the heel bone (Achilles tendon). This is usually caused by overusing the tendon and the ankle joint.  Achilles tendinitis usually gets better over time with treatment and caring for yourself at home. It can take weeks or months to heal completely.  What are the causes?  This condition may be caused by:  · A sudden increase in exercise or activity, such as running.  · Doing the same exercises or activities, such as jumping, over and over.  · Not warming up calf muscles before exercising.  · Exercising in shoes that are worn out or not made for exercise.  · Having arthritis or a bone growth (spur) on the back of the heel bone. This can rub against the tendon and hurt it.  · Age-related wear and tear. Tendons become less flexible with age and are more likely to be injured.  What are the signs or symptoms?  Common symptoms of this condition include:  · Pain in the Achilles tendon or in the back of the leg, just above the heel. The pain usually gets worse with exercise.  · Stiffness or soreness in the back of the leg, especially in the morning.  · Swelling of the skin over the Achilles tendon.  · Thickening of the tendon.  · Trouble standing on tiptoe.  How is this diagnosed?  This condition is diagnosed based on your symptoms and a physical exam. You may have tests, including:  · X-rays.  · MRI.  How is this treated?  The goal of treatment is to relieve symptoms and help your injury heal. Treatment may include:  · Decreasing or stopping activities that caused the tendinitis. This may mean switching to low-impact exercises like biking or swimming.  · Icing the injured " area.  · Doing physical therapy, including strengthening and stretching exercises.  · Taking NSAIDs, such as ibuprofen, to help relieve pain and swelling.  · Using supportive shoes, wraps, heel lifts, or a walking boot (air cast).  · Having surgery. This may be done if your symptoms do not improve after other treatments.  · Using high-energy shock wave impulses to stimulate the healing process (extracorporeal shock wave therapy). This is rare.  · Having an injection of medicines that help relieve inflammation (corticosteroids). This is rare.  Follow these instructions at home:  If you have an air cast:  · Wear the air cast as told by your health care provider. Remove it only as told by your health care provider.  · Loosen it if your toes tingle, become numb, or turn cold and blue.  · Keep it clean.  · If the air cast is not waterproof:  ? Do not let it get wet.  ? Cover it with a watertight covering when you take a bath or shower.  Managing pain, stiffness, and swelling    · If directed, put ice on the injured area. To do this:  ? If you have a removable air cast, remove it as told by your health care provider.  ? Put ice in a plastic bag.  ? Place a towel between your skin and the bag.  ? Leave the ice on for 20 minutes, 2-3 times a day.  · Move your toes often to reduce stiffness and swelling.  · Raise (elevate) your foot above the level of your heart while you are sitting or lying down.    Activity  · Gradually return to your normal activities as told by your health care provider. Ask your health care provider what activities are safe for you.  · Do not do activities that cause pain.  · Consider doing low-impact exercises, like cycling or swimming.  · Ask your health care provider when it is safe to drive if you have an air cast on your foot.  · If physical therapy was prescribed, do exercises as told by your health care provider or physical therapist.  General instructions  · If directed, wrap your foot with an  elastic bandage or other wrap. This can help to keep your tendon from moving too much while it heals. Your health care provider will show you how to wrap your foot correctly.  · Wear supportive shoes or heel lifts only as told by your health care provider.  · Take over-the-counter and prescription medicines only as told by your health care provider.  · Keep all follow-up visits as told by your health care provider. This is important.  Contact a health care provider if you:  · Have symptoms that get worse.  · Have pain that does not get better with medicine.  · Develop new, unexplained symptoms.  · Develop warmth and swelling in your foot.  · Have a fever.  Get help right away if you:  · Have a sudden popping sound or sensation in your Achilles tendon followed by severe pain.  · Cannot move your toes or foot.  · Cannot put any weight on your foot.  · Your foot or toes become numb and look white or blue even after loosening your bandage or air cast.  Summary  · Achilles tendinitis is inflammation of the tough, cord-like band that attaches the lower leg muscles to the heel bone (Achilles tendon).  · This condition is usually caused by overusing the tendon and the ankle joint. It can also be caused by arthritis or normal aging.  · The most common symptoms of this condition include pain, swelling, or stiffness in the Achilles tendon or in the back of the leg.  · This condition is usually treated by decreasing or stopping activities that caused the tendinitis, icing the injured area, taking NSAIDs, and doing physical therapy.  This information is not intended to replace advice given to you by your health care provider. Make sure you discuss any questions you have with your health care provider.  Document Revised: 05/04/2020 Document Reviewed: 05/04/2020  Elsevier Patient Education © 2021 [employer]

## 2022-02-16 NOTE — ASSESSMENT & PLAN NOTE
Apply a compressive ACE bandage. Rest and elevate the affected painful area.  Apply cold compresses intermittently as needed.  As pain recedes, begin normal activities slowly as tolerated.  Call if symptoms persist.  Refer to podiatry. Tylenol 1-2 tabs po q4h prn

## 2022-02-16 NOTE — PROGRESS NOTES
Chief Complaint  Follow-up (Lt foot pain, swollen, x5 days )    Subjective     {Problem List  Visit Diagnosis   Encounters  Notes  Medications  Labs  Result Review Imaging  Media :23}     Tania Blunt presents to [facility]  She has left heel pain for several weeks.  She has tried ice and rest but he pain continues.  Trouble wearing a show due to pain.    Foot Injury   The incident occurred more than 1 week ago. There was no injury mechanism. The pain is present in the left heel. The quality of the pain is described as aching. The pain is moderate. The pain has been constant since onset. Pertinent negatives include no muscle weakness, numbness or tingling. She reports no foreign bodies present. The symptoms are aggravated by palpation and movement. She has tried elevation, ice and rest for the symptoms. The treatment provided no relief.       Objective   Vital Signs:   /82 (BP Location: Left arm, Patient Position: Sitting, Cuff Size: Adult)   Pulse 81   Temp 97.7 °F (36.5 °C) (Infrared)   Wt 78.9 kg (174 lb)   SpO2 97%   BMI 30.82 kg/m²     Physical Exam  Vitals and nursing note reviewed.   Constitutional:       Appearance: Normal appearance.   Cardiovascular:      Rate and Rhythm: Regular rhythm.      Heart sounds: Normal heart sounds.   Pulmonary:      Breath sounds: Normal breath sounds.   Musculoskeletal:      Cervical back: Neck supple.      Left foot: Swelling and tenderness present.   Neurological:      Mental Status: She is alert.   Psychiatric:         Mood and Affect: Mood normal.        Result Review :   The following data was reviewed by: Arti Aceves MD on 02/16/2022:  Common labs    Common Labsle 4/13/21 4/13/21    0815 0815   Glucose  148 (A)   BUN  22   Creatinine  1.01 (A)   eGFR Non African Am  55 (A)   Sodium  135 (A)   Potassium  4.7   Chloride  99   Calcium  9.0   Albumin  3.90   Total Bilirubin  0.2   Alkaline Phosphatase  85   AST  (SGOT)  18   ALT (SGPT)  9   Total Cholesterol 146    Triglycerides 374 (A)    HDL Cholesterol 41    LDL Cholesterol  49    (A) Abnormal value                      Assessment and Plan    Diagnoses and all orders for this visit:    1. Achilles tendinitis of left lower extremity (Primary)  Assessment & Plan:  Apply a compressive ACE bandage. Rest and elevate the affected painful area.  Apply cold compresses intermittently as needed.  As pain recedes, begin normal activities slowly as tolerated.  Call if symptoms persist.  Refer to podiatry. Tylenol 1-2 tabs po q4h prn      Orders:  -     Ambulatory Referral to Podiatry      Follow Up   No follow-ups on file.  Patient was given instructions and counseling regarding her condition or for health maintenance advice. Please see specific information pulled into the AVS if appropriate.

## 2022-03-21 RX ORDER — GABAPENTIN 400 MG/1
CAPSULE ORAL
Qty: 270 CAPSULE | Refills: 1 | Status: SHIPPED | OUTPATIENT
Start: 2022-03-21 | End: 2022-09-28

## 2022-03-21 RX ORDER — DEXLANSOPRAZOLE 60 MG/1
CAPSULE, DELAYED RELEASE ORAL
Qty: 30 CAPSULE | Refills: 5 | Status: SHIPPED | OUTPATIENT
Start: 2022-03-21 | End: 2022-09-21

## 2022-03-28 ENCOUNTER — TELEPHONE (OUTPATIENT)
Dept: FAMILY MEDICINE CLINIC | Facility: CLINIC | Age: 67
End: 2022-03-28

## 2022-03-28 NOTE — TELEPHONE ENCOUNTER
Pa was not needed I went ahead and called her pharmacy to confirm this and they stated they were able to fill her RX he was not sure if it was in stock but he did say it did not need a PA and patient has been notified of this

## 2022-04-25 RX ORDER — HYDRALAZINE HYDROCHLORIDE 25 MG/1
TABLET, FILM COATED ORAL
Qty: 180 TABLET | Refills: 1 | Status: SHIPPED | OUTPATIENT
Start: 2022-04-25 | End: 2022-11-02

## 2022-05-25 RX ORDER — SITAGLIPTIN AND METFORMIN HYDROCHLORIDE 1000; 50 MG/1; MG/1
TABLET, FILM COATED, EXTENDED RELEASE ORAL
Qty: 180 TABLET | Refills: 0 | Status: SHIPPED | OUTPATIENT
Start: 2022-05-25 | End: 2022-09-06

## 2022-05-25 RX ORDER — CARVEDILOL 25 MG/1
TABLET, FILM COATED ORAL
Qty: 200 EACH | Refills: 5 | Status: SHIPPED | OUTPATIENT
Start: 2022-05-25

## 2022-05-25 NOTE — TELEPHONE ENCOUNTER
Caller: Tania Blunt    Relationship: Self    Best call back number: [phone]    Requested Prescriptions:   Requested Prescriptions     Pending Prescriptions Disp Refills   • glucose blood (Contour Test) test strip 200 each 5     Sig: Use as instructed        Pharm[facility] where request [facility] be sent[id number] - [place], [place] - [address] AT [address] OF [address] include [address] beds at the [facility] - [phone] [facility] [phone] FX     Additional details provided by patient: PATIENT HAS A WEEKS WORTH LEFT. PATIENT CALLED WHILE SHE WAS AT THE PHARMACY TODAY AND STATES THAT PHARMACY WAS TRYING TO SUPPLY HER WITH CONTOUR NEXT TEST STRIPS. PATIENT STATES THAT THESE WILL NOT WORK AND SHE NEEDS CONTOUR TEST STRIPS THAT'S NOT THE NEXT BRAND.    PLEASE CALL AND ADVISE.    Does the patient have less than a 3 day supply:  [] Yes  [x] No    Chiquis Chavez, [facility]   05/25/22 15:36 EDT

## 2022-06-09 ENCOUNTER — TELEPHONE (OUTPATIENT)
Dept: FAMILY MEDICINE CLINIC | Facility: CLINIC | Age: 67
End: 2022-06-09

## 2022-06-09 NOTE — TELEPHONE ENCOUNTER
PRASHANT WITH [facility] CALLED TO ADVISE THAT THE MEDICARE DIABETES FORM THEY RECEIVED BY FAX IS MISSING #3 ICD 10 CODE.   PLEASE FILL THAT PART AND RE-FAX -[phone]

## 2022-09-06 RX ORDER — SITAGLIPTIN AND METFORMIN HYDROCHLORIDE 1000; 50 MG/1; MG/1
TABLET, FILM COATED, EXTENDED RELEASE ORAL
Qty: 180 TABLET | Refills: 0 | Status: SHIPPED | OUTPATIENT
Start: 2022-09-06 | End: 2022-12-14

## 2022-09-12 DIAGNOSIS — E11.9 TYPE 2 DIABETES MELLITUS WITHOUT COMPLICATION, WITHOUT LONG-TERM CURRENT USE OF INSULIN: Primary | ICD-10-CM

## 2022-09-14 ENCOUNTER — LAB (OUTPATIENT)
Dept: FAMILY MEDICINE CLINIC | Facility: CLINIC | Age: 67
End: 2022-09-14

## 2022-09-14 LAB
ALBUMIN SERPL-MCNC: 3.7 G/DL (ref 3.5–5.2)
ALBUMIN/GLOB SERPL: 1.1 G/DL
ALP SERPL-CCNC: 87 U/L (ref 39–117)
ALT SERPL W P-5'-P-CCNC: 7 U/L (ref 1–33)
ANION GAP SERPL CALCULATED.3IONS-SCNC: 10.9 MMOL/L (ref 5–15)
AST SERPL-CCNC: 8 U/L (ref 1–32)
BILIRUB SERPL-MCNC: 0.3 MG/DL (ref 0–1.2)
BUN SERPL-MCNC: 16 MG/DL (ref 8–23)
BUN/CREAT SERPL: 17 (ref 7–25)
CALCIUM SPEC-SCNC: 9.6 MG/DL (ref 8.6–10.5)
CHLORIDE SERPL-SCNC: 102 MMOL/L (ref 98–107)
CHOLEST SERPL-MCNC: 152 MG/DL (ref 0–200)
CO2 SERPL-SCNC: 25.1 MMOL/L (ref 22–29)
CREAT SERPL-MCNC: 0.94 MG/DL (ref 0.57–1)
EGFRCR SERPLBLD CKD-EPI 2021: 66.6 ML/MIN/1.73
GLOBULIN UR ELPH-MCNC: 3.3 GM/DL
GLUCOSE SERPL-MCNC: 152 MG/DL (ref 65–99)
HBA1C MFR BLD: 7.6 % (ref 3.5–5.6)
HDLC SERPL-MCNC: 44 MG/DL (ref 40–60)
LDLC SERPL CALC-MCNC: 63 MG/DL (ref 0–100)
LDLC/HDLC SERPL: 1.18 {RATIO}
POTASSIUM SERPL-SCNC: 4.5 MMOL/L (ref 3.5–5.2)
PROT SERPL-MCNC: 7 G/DL (ref 6–8.5)
SODIUM SERPL-SCNC: 138 MMOL/L (ref 136–145)
TRIGL SERPL-MCNC: 281 MG/DL (ref 0–150)
VLDLC SERPL-MCNC: 45 MG/DL (ref 5–40)

## 2022-09-14 PROCEDURE — 80061 LIPID PANEL: CPT | Performed by: FAMILY MEDICINE

## 2022-09-14 PROCEDURE — 36415 COLL VENOUS BLD VENIPUNCTURE: CPT | Performed by: FAMILY MEDICINE

## 2022-09-14 PROCEDURE — 83036 HEMOGLOBIN GLYCOSYLATED A1C: CPT | Performed by: FAMILY MEDICINE

## 2022-09-14 PROCEDURE — 80053 COMPREHEN METABOLIC PANEL: CPT | Performed by: FAMILY MEDICINE

## 2022-09-21 ENCOUNTER — OFFICE VISIT (OUTPATIENT)
Dept: FAMILY MEDICINE CLINIC | Facility: CLINIC | Age: 67
End: 2022-09-21

## 2022-09-21 ENCOUNTER — HOSPITAL ENCOUNTER (OUTPATIENT)
Dept: GENERAL RADIOLOGY | Facility: HOSPITAL | Age: 67
Discharge: HOME | End: 2022-09-21
Admitting: FAMILY MEDICINE

## 2022-09-21 DIAGNOSIS — G89.29 CHRONIC PAIN OF LEFT KNEE: ICD-10-CM

## 2022-09-21 DIAGNOSIS — M25.562 CHRONIC PAIN OF LEFT KNEE: ICD-10-CM

## 2022-09-21 DIAGNOSIS — Z00.00 MEDICARE ANNUAL WELLNESS VISIT, SUBSEQUENT: Primary | ICD-10-CM

## 2022-09-21 DIAGNOSIS — Z23 NEED FOR VACCINATION: ICD-10-CM

## 2022-09-21 DIAGNOSIS — Z12.31 ENCOUNTER FOR SCREENING MAMMOGRAM FOR BREAST CANCER: ICD-10-CM

## 2022-09-21 PROCEDURE — 1170F FXNL STATUS ASSESSED: CPT | Performed by: FAMILY MEDICINE

## 2022-09-21 PROCEDURE — 1159F MED LIST DOCD IN RCRD: CPT | Performed by: FAMILY MEDICINE

## 2022-09-21 PROCEDURE — 90662 IIV NO PRSV INCREASED AG IM: CPT | Performed by: FAMILY MEDICINE

## 2022-09-21 PROCEDURE — G0008 ADMIN INFLUENZA VIRUS VAC: HCPCS | Performed by: FAMILY MEDICINE

## 2022-09-21 PROCEDURE — 73562 X-RAY EXAM OF KNEE 3: CPT

## 2022-09-21 PROCEDURE — G0439 PPPS, SUBSEQ VISIT: HCPCS | Performed by: FAMILY MEDICINE

## 2022-09-21 RX ORDER — DEXLANSOPRAZOLE 60 MG/1
CAPSULE, DELAYED RELEASE ORAL
Qty: 30 CAPSULE | Refills: 5 | Status: SHIPPED | OUTPATIENT
Start: 2022-09-21 | End: 2023-01-13

## 2022-09-28 RX ORDER — GABAPENTIN 400 MG/1
CAPSULE ORAL
Qty: 270 CAPSULE | Refills: 1 | Status: SHIPPED | OUTPATIENT
Start: 2022-09-28

## 2022-10-03 ENCOUNTER — HOSPITAL ENCOUNTER (OUTPATIENT)
Dept: MAMMOGRAPHY | Facility: HOSPITAL | Age: 67
Discharge: HOME | End: 2022-10-03
Admitting: FAMILY MEDICINE

## 2022-10-03 PROCEDURE — 77067 SCR MAMMO BI INCL CAD: CPT

## 2022-10-03 PROCEDURE — 77063 BREAST TOMOSYNTHESIS BI: CPT

## 2022-10-15 VITALS
BODY MASS INDEX: 29.59 KG/M2 | WEIGHT: 167 LBS | HEART RATE: 90 BPM | SYSTOLIC BLOOD PRESSURE: 142 MMHG | HEIGHT: 63 IN | OXYGEN SATURATION: 95 % | DIASTOLIC BLOOD PRESSURE: 67 MMHG | TEMPERATURE: 97.6 F

## 2022-10-15 PROBLEM — M25.562 CHRONIC PAIN OF LEFT KNEE: Status: ACTIVE | Noted: 2022-10-15

## 2022-10-15 PROBLEM — Z12.31 ENCOUNTER FOR SCREENING MAMMOGRAM FOR BREAST CANCER: Status: ACTIVE | Noted: 2022-10-15

## 2022-10-15 PROBLEM — Z23 NEED FOR VACCINATION: Status: ACTIVE | Noted: 2022-10-15

## 2022-10-15 PROBLEM — G89.29 CHRONIC PAIN OF LEFT KNEE: Status: ACTIVE | Noted: 2022-10-15

## 2022-10-15 NOTE — PATIENT INSTRUCTIONS
Medicare Wellness  Personal Prevention Plan of Service     Date of Office Visit:    Encounter Provider:  Arti Aceves MD  Place of Service:  [facility]  Patient Name: Tania Blunt  :  1955    As part of the Medicare Wellness portion of your visit today, we are providing you with this personalized preventive plan of services (PPPS). This plan is based upon recommendations of the [place] Preventive Services Task Force (USPSTF) and the Advisory Committee on Immunization Practices (ACIP).    This lists the preventive care services that should be considered, and provides dates of when you are due. Items listed as completed are up-to-date and do not require any further intervention.    Health Maintenance   Topic Date Due    DXA SCAN  Never done    COVID-19 Vaccine (1) Never done    TDAP/TD VACCINES (1 - Tdap) Never done    ZOSTER VACCINE (1 of 2) Never done    URINE MICROALBUMIN  2018    HEPATITIS C SCREENING  Never done    DIABETIC FOOT EXAM  Never done    Pneumococcal Vaccine 65+ (2 - PPSV23 if available, else PCV20) 2021    LUNG CANCER SCREENING  10/15/2021    HEMOGLOBIN A1C  2023    DIABETIC EYE EXAM  2023    LIPID PANEL  2023    ANNUAL WELLNESS VISIT  2023    MAMMOGRAM  10/03/2024    COLORECTAL CANCER SCREENING  2030    INFLUENZA VACCINE  Completed       Orders Placed This Encounter   Procedures    XR Knee 3 View Left     Standing Status:   Future     Number of Occurrences:   1     Standing Expiration Date:   2023     Order Specific Question:   Reason for Exam:     Answer:   left knee pain     Order Specific Question:   Does this patient have a diabetic monitoring/medication delivering device on?     Answer:   No     Order Specific Question:   Release to patient     Answer:   Routine Release    Mammo Screening Digital Tomosynthesis Bilateral With CAD     Order Specific Question:   Reason for Exam:     Answer:    screening for breast cancer    Fluzone High-Dose 65+yrs (1741-7993)    Ambulatory Referral to Pain Management     Referral Priority:   Routine     Referral Type:   Pain Management     Referral Reason:   Specialty Services Required     Referred to Provider:   Lexx Louis MD     Requested Specialty:   Pain Medicine     Number of Visits Requested:   1       Return in about 1 year (around 9/21/2023) for Medicare Wellness.

## 2022-11-02 RX ORDER — SIMVASTATIN 40 MG
TABLET ORAL
Qty: 90 TABLET | Refills: 3 | Status: SHIPPED | OUTPATIENT
Start: 2022-11-02

## 2022-11-02 RX ORDER — HYDRALAZINE HYDROCHLORIDE 25 MG/1
TABLET, FILM COATED ORAL
Qty: 180 TABLET | Refills: 1 | Status: SHIPPED | OUTPATIENT
Start: 2022-11-02 | End: 2023-01-04

## 2022-11-03 ENCOUNTER — OFFICE VISIT (OUTPATIENT)
Dept: FAMILY MEDICINE CLINIC | Facility: CLINIC | Age: 67
End: 2022-11-03

## 2022-11-03 VITALS
RESPIRATION RATE: 16 BRPM | BODY MASS INDEX: 29.23 KG/M2 | HEART RATE: 79 BPM | WEIGHT: 165 LBS | HEIGHT: 63 IN | SYSTOLIC BLOOD PRESSURE: 158 MMHG | DIASTOLIC BLOOD PRESSURE: 76 MMHG | OXYGEN SATURATION: 96 % | TEMPERATURE: 98.4 F

## 2022-11-03 DIAGNOSIS — R30.0 DYSURIA: Primary | ICD-10-CM

## 2022-11-03 LAB
BILIRUB BLD-MCNC: NEGATIVE MG/DL
CLARITY, POC: CLEAR
COLOR UR: YELLOW
EXPIRATION DATE: ABNORMAL
GLUCOSE UR STRIP-MCNC: NEGATIVE MG/DL
KETONES UR QL: NEGATIVE
LEUKOCYTE EST, POC: NEGATIVE
Lab: ABNORMAL
NITRITE UR-MCNC: NEGATIVE MG/ML
PH UR: 6.5 [PH] (ref 5–8)
PROT UR STRIP-MCNC: ABNORMAL MG/DL
RBC # UR STRIP: ABNORMAL /UL
SP GR UR: 1.02 (ref 1–1.03)
UROBILINOGEN UR QL: ABNORMAL

## 2022-11-03 PROCEDURE — 87086 URINE CULTURE/COLONY COUNT: CPT | Performed by: FAMILY MEDICINE

## 2022-11-03 PROCEDURE — 87077 CULTURE AEROBIC IDENTIFY: CPT | Performed by: FAMILY MEDICINE

## 2022-11-03 PROCEDURE — 87186 SC STD MICRODIL/AGAR DIL: CPT | Performed by: FAMILY MEDICINE

## 2022-11-03 PROCEDURE — 99213 OFFICE O/P EST LOW 20 MIN: CPT | Performed by: FAMILY MEDICINE

## 2022-11-03 PROCEDURE — 81003 URINALYSIS AUTO W/O SCOPE: CPT | Performed by: FAMILY MEDICINE

## 2022-11-03 RX ORDER — SULFAMETHOXAZOLE AND TRIMETHOPRIM 800; 160 MG/1; MG/1
1 TABLET ORAL 2 TIMES DAILY
Qty: 14 TABLET | Refills: 0 | Status: SHIPPED | OUTPATIENT
Start: 2022-11-03 | End: 2022-11-10

## 2022-11-03 NOTE — PROGRESS NOTES
Subjective   Chief Complaint   Patient presents with   • Back Pain     Right side/ urine urgency   • Urinary Frequency     Tania Blunt is a 67 y.o. female.     Patient Care Team:  Arti Aceves MD as PCP - General  Banet, Duane Edward, MD as Consulting Physician (Dermatology)  Benny aHll MD as Consulting Physician (Cardiology)  Stanton Reddy MD as Consulting Physician (Cardiology)    History of Present Illness  She is coming in today due to urinary symptoms.  She reports that over the last few weeks she has been noticing some urinary frequency and even urgency, it was somehow fluctuated in intensity and lately it is getting worse.  She also noted some discomfort in the right lower back area lately.  Even that is fluctuating in intensity.  No chills or fever reported.  No blood in urine.  She is a status post left side nephrectomy over 30 years ago.  She was treated for urinary tract infections over the years, but not really frequently.       The following portions of the patient's history were reviewed and updated as appropriate: allergies, current medications, past family history, past medical history, past social history, past surgical history and problem list.  Past Medical History:   Diagnosis Date   • Arthritis    • CAD (coronary artery disease)    • Diabetes (HCC)    • GERD (gastroesophageal reflux disease)    • Heart disease    • Hypertension    • Hypoxemia      Past Surgical History:   Procedure Laterality Date   • APPENDECTOMY     • CARDIAC CATHETERIZATION  07/29/2015    No Intervention: Disease noted RCA & Circumflex Artery   • COLONOSCOPY  09/21/2020    Cologuard negative.   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   • TUBAL ABDOMINAL LIGATION Bilateral      The patient has a family history of  Family History   Problem Relation Age of Onset   • Hypertension Mother    • Colon cancer Mother    • Lung cancer Father    •  "Heart disease Maternal Grandmother      Social History     Socioeconomic History   • Marital status:    Tobacco Use   • Smoking status: Some Days     Packs/day: 1.00     Years: 50.00     Pack years: 50.00     Types: Cigarettes   • Smokeless tobacco: Never   Vaping Use   • Vaping Use: Never used   Substance and Sexual Activity   • Alcohol use: No     Comment: drinks caffeine free sodas   • Drug use: Never   • Sexual activity: Not Currently     Partners: Male       Review of Systems   Constitutional: Negative for chills, fatigue and fever.   Gastrointestinal: Negative for abdominal pain, diarrhea, nausea and vomiting.   Genitourinary: Positive for dysuria, flank pain, frequency and urgency. Negative for hematuria and pelvic pain.     Visit Vitals  /76 (BP Location: Left arm, Patient Position: Sitting, Cuff Size: Adult)   Pulse 79   Temp 98.4 °F (36.9 °C) (Temporal)   Resp 16   Ht 160 cm (62.99\")   Wt 74.8 kg (165 lb)   SpO2 96%   BMI 29.24 kg/m²       Current Outpatient Medications:   •  aspirin 81 MG tablet, Take 1 tablet by mouth 2 (Two) Times a Day., Disp: , Rfl:   •  Blood Glucose Monitoring Suppl (ONE TOUCH ULTRA 2) w/Device kit, ONETOUCH ULTRA 2 w/Device KIT, Disp: , Rfl:   •  Dexilant 60 MG capsule, TAKE 1 CAPSULE BY MOUTH DAILY, Disp: 30 capsule, Rfl: 5  •  gabapentin (NEURONTIN) 400 MG capsule, TAKE 1 CAPSULE BY MOUTH THREE TIMES DAILY FOR NERVE PAIN, Disp: 270 capsule, Rfl: 1  •  glimepiride (AMARYL) 2 MG tablet, TAKE 1 TABLET BY MOUTH TWICE DAILY WITH BREAKFAST AND DINNER, Disp: 180 tablet, Rfl: 0  •  glucose blood (Contour Test) test strip, Use to test blood sugar twice daily E11.65, Disp: 200 each, Rfl: 5  •  hydrALAZINE (APRESOLINE) 25 MG tablet, TAKE 1 TABLET BY MOUTH TWICE DAILY, Disp: 180 tablet, Rfl: 1  •  HYDROcodone-acetaminophen (Norco) 7.5-325 MG per tablet, Take 1 tablet by mouth Every 6 (Six) Hours As Needed for Moderate Pain ., Disp: 28 tablet, Rfl: 0  •  isosorbide mononitrate " (IMDUR) 60 MG 24 hr tablet, Take 1 tablet by mouth Daily., Disp: 90 tablet, Rfl: 3  •  Janumet XR  MG tablet, TAKE 1 TABLET BY MOUTH TWICE DAILY, Disp: 180 tablet, Rfl: 0  •  Lancets Thin misc, LANCETS THIN, Disp: , Rfl:   •  metoprolol tartrate (LOPRESSOR) 25 MG tablet, Take 1 tablet by mouth 2 (Two) Times a Day., Disp: 180 tablet, Rfl: 3  •  mupirocin (BACTROBAN) 2 % ointment, APPLY TO AFFECTED AREAS ON HANDS TWICE DAILY, Disp: , Rfl:   •  OTEZLA 30 MG tablet, Take 30 mg by mouth Daily., Disp: , Rfl:   •  simvastatin (ZOCOR) 40 MG tablet, TAKE 1 TABLET BY MOUTH EVERY EVENING FOR HIGH CHOLESTEROL, Disp: 90 tablet, Rfl: 3  •  albuterol sulfate  (90 Base) MCG/ACT inhaler, Inhale 2 puffs Every 4 (Four) Hours As Needed for Wheezing or Shortness of Air., Disp: 18 g, Rfl: 1  •  Omega-3 Fatty Acids (fish oil) 1000 MG capsule capsule, Take  by mouth Daily With Breakfast., Disp: , Rfl:   •  sulfamethoxazole-trimethoprim (Bactrim DS) 800-160 MG per tablet, Take 1 tablet by mouth 2 (Two) Times a Day for 7 days., Disp: 14 tablet, Rfl: 0  •  triamcinolone (KENALOG) 0.1 % cream, APPLY TO HANDS AND FEET TWICE DAILY, Disp: , Rfl:     Objective   Physical Exam  Constitutional:       General: She is not in acute distress.     Appearance: Normal appearance. She is well-developed. She is not ill-appearing or diaphoretic.      Comments: Patient is in no distress, patient has normal voice and speech.  Normal respiratory effort.   HENT:      Head: Normocephalic and atraumatic.   Pulmonary:      Effort: Pulmonary effort is normal.   Abdominal:      General: There is no distension.      Tenderness: There is no abdominal tenderness. There is no right CVA tenderness, left CVA tenderness, guarding or rebound.      Hernia: No hernia is present.   Musculoskeletal:      Cervical back: Normal range of motion and neck supple.   Neurological:      General: No focal deficit present.      Mental Status: She is alert and oriented to  person, place, and time. Mental status is at baseline.   Psychiatric:         Mood and Affect: Mood normal.         Assessment & Plan   Diagnoses and all orders for this visit:    1. Dysuria (Primary)  -     POC Urinalysis Dipstick, Automated  -     Urine Culture - Urine, Urine, Clean Catch  -     sulfamethoxazole-trimethoprim (Bactrim DS) 800-160 MG per tablet; Take 1 tablet by mouth 2 (Two) Times a Day for 7 days.  Dispense: 14 tablet; Refill: 0      I will be sending urine off for culture.  I will also start her on antibiotic and she was advised to increase p.o. water intake for good hydration.  We will contact the patient with the urine culture results once available.        Return if symptoms worsen or fail to improve, for Recheck.    Requested Prescriptions     Signed Prescriptions Disp Refills   • sulfamethoxazole-trimethoprim (Bactrim DS) 800-160 MG per tablet 14 tablet 0     Sig: Take 1 tablet by mouth 2 (Two) Times a Day for 7 days.

## 2022-11-05 LAB — BACTERIA SPEC AEROBE CULT: ABNORMAL

## 2022-12-14 RX ORDER — SITAGLIPTIN AND METFORMIN HYDROCHLORIDE 1000; 50 MG/1; MG/1
TABLET, FILM COATED, EXTENDED RELEASE ORAL
Qty: 180 TABLET | Refills: 0 | Status: SHIPPED | OUTPATIENT
Start: 2022-12-14

## 2022-12-14 NOTE — TELEPHONE ENCOUNTER
Rx Refill Note  Requested Prescriptions     Pending Prescriptions Disp Refills   • metoprolol tartrate (LOPRESSOR) 25 MG tablet [Pharmacy Med Name: METOPROLOL TARTRATE 25MG TABLETS] 180 tablet 3     Sig: TAKE 1 TABLET BY MOUTH TWICE DAILY      Last office visit with prescribing clinician: 11/9/2021   Last telemedicine visit with prescribing clinician: 1/4/2023   Next office visit with prescribing clinician: 1/4/2023                         Would you like a call back once the refill request has been completed: [] Yes [] No    If the office needs to give you a call back, can they leave a voicemail: [] Yes [] No    Katty Garvey MA  12/14/22, 12:13 EST

## 2022-12-27 RX ORDER — ISOSORBIDE MONONITRATE 60 MG/1
60 TABLET, EXTENDED RELEASE ORAL EVERY 24 HOURS
Qty: 90 TABLET | Refills: 0 | Status: SHIPPED | OUTPATIENT
Start: 2022-12-27 | End: 2023-04-04

## 2022-12-27 NOTE — TELEPHONE ENCOUNTER
Rx Refill Note  Requested Prescriptions     Pending Prescriptions Disp Refills   • isosorbide mononitrate (IMDUR) 60 MG 24 hr tablet [Pharmacy Med Name: ISOSORBIDE MONONITRATE 60MG ER TABS] 90 tablet 3     Sig: TAKE 1 TABLET BY MOUTH DAILY      Last office visit with prescribing clinician: 11/9/2021   Last telemedicine visit with prescribing clinician: 1/4/2023   Next office visit with prescribing clinician: 1/4/2023                         Would you like a call back once the refill request has been completed: [] Yes [] No    If the office needs to give you a call back, can they leave a voicemail: [] Yes [] No    Katty Garvey MA  12/27/22, 08:42 EST

## 2023-01-04 ENCOUNTER — OFFICE VISIT (OUTPATIENT)
Dept: CARDIOLOGY | Facility: CLINIC | Age: 68
End: 2023-01-04
Payer: MEDICARE

## 2023-01-04 VITALS
DIASTOLIC BLOOD PRESSURE: 81 MMHG | WEIGHT: 161 LBS | HEIGHT: 62 IN | OXYGEN SATURATION: 99 % | SYSTOLIC BLOOD PRESSURE: 153 MMHG | HEART RATE: 73 BPM | BODY MASS INDEX: 29.63 KG/M2

## 2023-01-04 DIAGNOSIS — E78.5 DYSLIPIDEMIA: ICD-10-CM

## 2023-01-04 DIAGNOSIS — I25.10 CORONARY ARTERY DISEASE INVOLVING NATIVE CORONARY ARTERY OF NATIVE HEART WITHOUT ANGINA PECTORIS: ICD-10-CM

## 2023-01-04 DIAGNOSIS — E11.65 TYPE 2 DIABETES MELLITUS WITH HYPERGLYCEMIA, WITHOUT LONG-TERM CURRENT USE OF INSULIN: ICD-10-CM

## 2023-01-04 DIAGNOSIS — I10 ESSENTIAL HYPERTENSION: Primary | ICD-10-CM

## 2023-01-04 PROCEDURE — 93000 ELECTROCARDIOGRAM COMPLETE: CPT | Performed by: INTERNAL MEDICINE

## 2023-01-04 PROCEDURE — 99214 OFFICE O/P EST MOD 30 MIN: CPT | Performed by: INTERNAL MEDICINE

## 2023-01-04 PROCEDURE — 1160F RVW MEDS BY RX/DR IN RCRD: CPT | Performed by: INTERNAL MEDICINE

## 2023-01-04 PROCEDURE — 1159F MED LIST DOCD IN RCRD: CPT | Performed by: INTERNAL MEDICINE

## 2023-01-04 RX ORDER — METOPROLOL SUCCINATE 50 MG/1
50 TABLET, EXTENDED RELEASE ORAL DAILY
Qty: 90 TABLET | Refills: 3 | Status: SHIPPED | OUTPATIENT
Start: 2023-01-04

## 2023-01-04 RX ORDER — AMLODIPINE BESYLATE 10 MG/1
10 TABLET ORAL DAILY
Qty: 90 TABLET | Refills: 3 | Status: SHIPPED | OUTPATIENT
Start: 2023-01-04

## 2023-01-04 NOTE — PROGRESS NOTES
Subjective:     Encounter Date:01/04/2023      Patient ID: Tania Blunt is a 67 y.o. female.    Chief Complaint : Follow for CAD, hypertension, dyslipidemia, diabetes    History of Present Illness      Ms. Tania Blunt  has PMH of    CAD, cardiac cath 4/2013 revealing 50% ostial RCA and 40% ostial LCx disease.  Hypertension  Hyperlipidemia  Diabetes  BIJU  Psoriasis  Histoplasmosis  Cholecystectomy and tubal ligation  Family history of heart disease in maternal grandmother  Cigarette smoker     Here for follow-up..  Patient denies any chest pain or shortness of breath.    Patient's arterial blood pressure is 153/81, heart rate 73, O2 sat of 99% on room air.  BMI is over 30.    Labs from 4/13/2021 reveal CMP with a creatinine of 1.01 GFR 55 glucose 148.  Cholesterol 146 triglycerides 374 HDL 41 LDL 49.  A1c from 9/15/2020 was 9.5.        Assessment :     CAD  Hypertension  Dyslipidemia  Diabetes  BIJU  Obesity BMI over 30     Recommendations and plans :     Reviewed EKG results with patient.  Patient blood pressure is elevated.  We will change metoprolol to succinate for easier of use and  discontinue hydralazine which is multiple times a day and add amlodipine 10 mg continue simvastatin and aspirin as tolerated to help with hypertension CAD dyslipidemia   Reviewed BMI over 30 counseled on weight loss diet and exercise.  Follow-up with PMD for diabetes care.    Counseled on smoking cessation.          ECG 12 Lead    Date/Time: 1/4/2023 12:19 PM  Performed by: Stanton Reddy MD  Authorized by: Stanton Reddy MD   Comparison: compared with previous ECG from 11/9/2021  Comparison to previous ECG: EKG done today reviewed/interpreted by me reveals sinus rhythm at the rate of 76 bpm, no new change compared EKG from 11/9/2021              Copied text in this portion of the note has been reviewed and is accurate as of 1/4/2023  The following portions of the patient's history were reviewed and  updated as appropriate: allergies, current medications, past family history, past medical history, past social history, past surgical history and problem list.    Assessment:         MDM     Diagnosis Plan   1. Essential hypertension        2. Coronary artery disease involving native coronary artery of native heart without angina pectoris        3. Dyslipidemia        4. Type 2 diabetes mellitus with hyperglycemia, without long-term current use of insulin (HCC)               Plan:               Past Medical History:  Past Medical History:   Diagnosis Date   • Arthritis    • CAD (coronary artery disease)    • Diabetes (HCC)    • GERD (gastroesophageal reflux disease)    • Heart disease    • Hypertension    • Hypoxemia      Past Surgical History:  Past Surgical History:   Procedure Laterality Date   • APPENDECTOMY     • CARDIAC CATHETERIZATION  07/29/2015    No Intervention: Disease noted RCA & Circumflex Artery   • COLONOSCOPY  09/21/2020    Cologuard negative.   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   • TUBAL ABDOMINAL LIGATION Bilateral       Allergies:  No Known Allergies  Home Meds:  Current Meds:     Current Outpatient Medications:   •  aspirin 81 MG tablet, Take 1 tablet by mouth 2 (Two) Times a Day., Disp: , Rfl:   •  Dexilant 60 MG capsule, TAKE 1 CAPSULE BY MOUTH DAILY, Disp: 30 capsule, Rfl: 5  •  gabapentin (NEURONTIN) 400 MG capsule, TAKE 1 CAPSULE BY MOUTH THREE TIMES DAILY FOR NERVE PAIN, Disp: 270 capsule, Rfl: 1  •  glimepiride (AMARYL) 2 MG tablet, TAKE 1 TABLET BY MOUTH TWICE DAILY WITH BREAKFAST AND DINNER, Disp: 180 tablet, Rfl: 0  •  HYDROcodone-acetaminophen (Norco) 7.5-325 MG per tablet, Take 1 tablet by mouth Every 6 (Six) Hours As Needed for Moderate Pain ., Disp: 28 tablet, Rfl: 0  •  isosorbide mononitrate (IMDUR) 60 MG 24 hr tablet, TAKE 1 TABLET BY MOUTH DAILY, Disp: 90 tablet, Rfl: 0  •  Janumet XR  MG tablet, TAKE 1 TABLET BY  MOUTH TWICE DAILY, Disp: 180 tablet, Rfl: 0  •  Omega-3 Fatty Acids (fish oil) 1000 MG capsule capsule, Take  by mouth Daily With Breakfast., Disp: , Rfl:   •  OTEZLA 30 MG tablet, Take 30 mg by mouth Daily., Disp: , Rfl:   •  simvastatin (ZOCOR) 40 MG tablet, TAKE 1 TABLET BY MOUTH EVERY EVENING FOR HIGH CHOLESTEROL, Disp: 90 tablet, Rfl: 3  •  triamcinolone (KENALOG) 0.1 % cream, APPLY TO HANDS AND FEET TWICE DAILY, Disp: , Rfl:   •  albuterol sulfate  (90 Base) MCG/ACT inhaler, Inhale 2 puffs Every 4 (Four) Hours As Needed for Wheezing or Shortness of Air., Disp: 18 g, Rfl: 1  •  amLODIPine (NORVASC) 10 MG tablet, Take 1 tablet by mouth Daily., Disp: 90 tablet, Rfl: 3  •  Blood Glucose Monitoring Suppl (ONE TOUCH ULTRA 2) w/Device kit, ONETOUCH ULTRA 2 w/Device KIT, Disp: , Rfl:   •  glucose blood (Contour Test) test strip, Use to test blood sugar twice daily E11.65, Disp: 200 each, Rfl: 5  •  Lancets Thin misc, LANCETS THIN, Disp: , Rfl:   •  metoprolol succinate XL (TOPROL-XL) 50 MG 24 hr tablet, Take 1 tablet by mouth Daily., Disp: 90 tablet, Rfl: 3  •  mupirocin (BACTROBAN) 2 % ointment, APPLY TO AFFECTED AREAS ON HANDS TWICE DAILY, Disp: , Rfl:   Social History:   Social History     Tobacco Use   • Smoking status: Some Days     Packs/day: 1.00     Years: 50.00     Pack years: 50.00     Types: Cigarettes   • Smokeless tobacco: Never   Substance Use Topics   • Alcohol use: No     Comment: drinks caffeine free sodas      Family History:  Family History   Problem Relation Age of Onset   • Hypertension Mother    • Colon cancer Mother    • Lung cancer Father    • Heart disease Maternal Grandmother               Review of Systems   Constitutional: Negative for malaise/fatigue.   Cardiovascular: Negative for chest pain, leg swelling and palpitations.   Respiratory: Negative for shortness of breath.    Skin: Negative for rash.   Neurological: Positive for dizziness (WITH BENDING). Negative for light-headedness  and numbness.     All other systems are negative         Objective:     Physical Exam  /81 Comment: FORGOT TO TAKE MEDICATION  Pulse 73   Ht 157.5 cm (62\")   Wt 73 kg (161 lb)   SpO2 99%   BMI 29.45 kg/m²   General:  Appears in no acute distress  Eyes: Sclera is anicteric,  conjunctiva is clear   HEENT:  No JVD.  No carotid bruits  Respiratory: Respirations regular and unlabored at rest.  Clear to auscultation  Cardiovascular: S1,S2 Regular rate and rhythm. No murmur, rub or gallop auscultated.   Extremities: No digital clubbing or cyanosis, no edema  Skin: Color pink. Skin warm and dry to touch. No rashes  No xanthoma  Neuro: Alert and awake.    Lab Reviewed:         Stanton Reddy MD  1/4/2023 12:44 EST      EMR Dragon/Transcription:   \"Dictated utilizing Dragon dictation\".

## 2023-01-13 RX ORDER — DEXLANSOPRAZOLE 60 MG/1
CAPSULE, DELAYED RELEASE ORAL
Qty: 90 CAPSULE | Refills: 1 | Status: SHIPPED | OUTPATIENT
Start: 2023-01-13

## 2023-01-16 ENCOUNTER — TELEPHONE (OUTPATIENT)
Dept: FAMILY MEDICINE CLINIC | Facility: CLINIC | Age: 68
End: 2023-01-16
Payer: MEDICARE

## 2023-01-16 NOTE — TELEPHONE ENCOUNTER
[facility] IS CALLING TO SEE IF THE PATIENTS DIABETIC DETAILED WRITTEN ORDER WAS RECEIVED.     [facility] STATES THAT THIS FORM WAS FAXED TO THE OFFICE ON 1/9/23 AND WILL BE SENT AGAIN ON 1/16/23    PLEASE ADVISE [phone]

## 2023-01-23 NOTE — TELEPHONE ENCOUNTER
Caller: [id number] - [place], [place] - [address] AT [address] & [address] - [phone]  - [phone] FX    Relationship: Pharmacy    Best call back number: [phone]    SHARON CALLED REQUESTING A NURSE PLEASE GIVE HER A CALLBACKK.    01/09/23 THEY SENT A FAX FOR A MEDICARE DIABETIC DETAILED WRITTEN ORDER TO OUR OFFICE ON THIS DATE, THEY HAVE NOT HEARD BACK FROM OUR OFFICE BUT THEY NEED DR RICO TO PLEASE FILL THIS OUT AND FAX IT BACK TO THEM ASAP    FAX: [phone]

## 2023-02-13 ENCOUNTER — OFFICE VISIT (OUTPATIENT)
Dept: FAMILY MEDICINE CLINIC | Facility: CLINIC | Age: 68
End: 2023-02-13
Payer: MEDICARE

## 2023-02-13 VITALS
WEIGHT: 158.9 LBS | HEIGHT: 62 IN | DIASTOLIC BLOOD PRESSURE: 60 MMHG | TEMPERATURE: 98.7 F | SYSTOLIC BLOOD PRESSURE: 139 MMHG | OXYGEN SATURATION: 96 % | HEART RATE: 83 BPM | BODY MASS INDEX: 29.24 KG/M2

## 2023-02-13 DIAGNOSIS — I10 PRIMARY HYPERTENSION: ICD-10-CM

## 2023-02-13 DIAGNOSIS — K21.9 GASTROESOPHAGEAL REFLUX DISEASE, UNSPECIFIED WHETHER ESOPHAGITIS PRESENT: Primary | ICD-10-CM

## 2023-02-13 PROCEDURE — 99214 OFFICE O/P EST MOD 30 MIN: CPT | Performed by: FAMILY MEDICINE

## 2023-02-13 RX ORDER — SUCRALFATE 1 G/1
1 TABLET ORAL 4 TIMES DAILY
Qty: 360 TABLET | Refills: 1 | Status: SHIPPED | OUTPATIENT
Start: 2023-02-13

## 2023-04-04 RX ORDER — ISOSORBIDE MONONITRATE 60 MG/1
60 TABLET, EXTENDED RELEASE ORAL EVERY 24 HOURS
Qty: 90 TABLET | Refills: 1 | Status: SHIPPED | OUTPATIENT
Start: 2023-04-04

## 2023-04-04 NOTE — TELEPHONE ENCOUNTER
Rx Refill Note  Requested Prescriptions     Pending Prescriptions Disp Refills   • isosorbide mononitrate (IMDUR) 60 MG 24 hr tablet [Pharmacy Med Name: ISOSORBIDE MONONITRATE 60MG ER TABS] 90 tablet 0     Sig: TAKE 1 TABLET BY MOUTH DAILY   • metoprolol tartrate (LOPRESSOR) 25 MG tablet [Pharmacy Med Name: METOPROLOL TARTRATE 25MG  TABLETS] 180 tablet 0     Sig: TAKE 1 TABLET BY MOUTH TWICE DAILY      Last office visit with prescribing clinician: 1/4/2023   Last telemedicine visit with prescribing clinician: Visit date not found   Next office visit with prescribing clinician: 1/3/2024                         Would you like a call back once the refill request has been completed: [] Yes [] No    If the office needs to give you a call back, can they leave a voicemail: [] Yes [] No    Katty Garvey MA  04/04/23, 09:18 EDT

## 2023-06-12 ENCOUNTER — OFFICE VISIT (OUTPATIENT)
Dept: FAMILY MEDICINE CLINIC | Facility: CLINIC | Age: 68
End: 2023-06-12
Payer: MEDICARE

## 2023-06-12 VITALS
RESPIRATION RATE: 16 BRPM | WEIGHT: 156 LBS | BODY MASS INDEX: 26.63 KG/M2 | HEIGHT: 64 IN | TEMPERATURE: 98 F | DIASTOLIC BLOOD PRESSURE: 60 MMHG | SYSTOLIC BLOOD PRESSURE: 129 MMHG | OXYGEN SATURATION: 94 % | HEART RATE: 78 BPM

## 2023-06-12 DIAGNOSIS — L30.4 INTERTRIGO: ICD-10-CM

## 2023-06-12 DIAGNOSIS — J06.9 UPPER RESPIRATORY TRACT INFECTION, UNSPECIFIED TYPE: Primary | ICD-10-CM

## 2023-06-12 PROCEDURE — 3074F SYST BP LT 130 MM HG: CPT | Performed by: FAMILY MEDICINE

## 2023-06-12 PROCEDURE — 3078F DIAST BP <80 MM HG: CPT | Performed by: FAMILY MEDICINE

## 2023-06-12 PROCEDURE — 99213 OFFICE O/P EST LOW 20 MIN: CPT | Performed by: FAMILY MEDICINE

## 2023-06-12 RX ORDER — NYSTATIN 100000 U/G
1 CREAM TOPICAL 2 TIMES DAILY
Qty: 60 G | Refills: 0 | Status: SHIPPED | OUTPATIENT
Start: 2023-06-12

## 2023-06-12 RX ORDER — AZITHROMYCIN 250 MG/1
TABLET, FILM COATED ORAL
Qty: 6 TABLET | Refills: 0 | Status: SHIPPED | OUTPATIENT
Start: 2023-06-12

## 2023-06-12 RX ORDER — FLUCONAZOLE 100 MG/1
100 TABLET ORAL DAILY
Qty: 7 TABLET | Refills: 0 | Status: SHIPPED | OUTPATIENT
Start: 2023-06-12

## 2023-06-12 NOTE — PROGRESS NOTES
Subjective   Chief Complaint   Patient presents with    Earache     left    Sore Throat    Cough     Rash abdomen     Tania Blunt is a 67 y.o. female.     Patient Care Team:  Arti Aceves MD as PCP - General  Banet, Duane Edward, MD as Consulting Physician (Dermatology)  Benny Hall MD as Consulting Physician (Cardiology)  Stanton Reddy MD as Consulting Physician (Cardiology)    History of Present Illness  She is coming in today due to upper respiratory symptoms which she has been dealing with for the last week or so.  She started with some congestion, sore throat, and also some cough.  At the beginning she had productive cough, but that has improved.  She spent time with her granddaughter about a week ago and she had similar symptoms at that time.  Lately she has been experiencing some discomfort in the left ear.  She is a smoker and smokes about 1 pack a day.  She also wants to talk about the skin rash she has noted under her abdominal panniculus about a week ago.  It is spreading into her groin areas.  She has tried Desitin over-the-counter which is not helping.  The rash is causing some discomfort.     The following portions of the patient's history were reviewed and updated as appropriate: allergies, current medications, past family history, past medical history, past social history, past surgical history, and problem list.  Past Medical History:   Diagnosis Date    Arthritis     CAD (coronary artery disease)     Diabetes     GERD (gastroesophageal reflux disease)     Heart disease     Hypertension     Hypoxemia      Past Surgical History:   Procedure Laterality Date    APPENDECTOMY      CARDIAC CATHETERIZATION  07/29/2015    No Intervention: Disease noted RCA & Circumflex Artery    COLONOSCOPY  09/21/2020    Cologuard negative.    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    TUBAL ABDOMINAL LIGATION Bilateral      The patient  "has a family history of  Family History   Problem Relation Age of Onset    Hypertension Mother     Colon cancer Mother     Lung cancer Father     Heart disease Maternal Grandmother      Social History     Socioeconomic History    Marital status:    Tobacco Use    Smoking status: Every Day     Packs/day: 1.00     Years: 50.00     Pack years: 50.00     Types: Cigarettes    Smokeless tobacco: Never   Vaping Use    Vaping Use: Never used   Substance and Sexual Activity    Alcohol use: No     Comment: drinks caffeine free sodas    Drug use: Never    Sexual activity: Not Currently     Partners: Male       Review of Systems   Constitutional:  Negative for activity change, appetite change, chills and fever.   HENT:  Positive for congestion, ear pain and sore throat. Negative for postnasal drip, sinus pressure and swollen glands.    Respiratory:  Positive for cough. Negative for choking, chest tightness, shortness of breath, wheezing and stridor.    Cardiovascular:  Negative for chest pain.   Skin:  Positive for rash. Negative for dry skin.   Visit Vitals  /60 (BP Location: Left arm, Patient Position: Sitting, Cuff Size: Adult)   Pulse 78   Temp 98 °F (36.7 °C) (Infrared)   Resp 16   Ht 162.6 cm (64\")   Wt 70.8 kg (156 lb)   SpO2 94%   BMI 26.78 kg/m²              Current Outpatient Medications:     amLODIPine (NORVASC) 10 MG tablet, Take 1 tablet by mouth Daily., Disp: 90 tablet, Rfl: 3    aspirin 81 MG tablet, Take 1 tablet by mouth 2 (Two) Times a Day., Disp: , Rfl:     dexlansoprazole (DEXILANT) 60 MG capsule, TAKE 1 CAPSULE BY MOUTH DAILY, Disp: 90 capsule, Rfl: 1    gabapentin (NEURONTIN) 400 MG capsule, TAKE 1 CAPSULE BY MOUTH THREE TIMES DAILY FOR NERVE PAIN, Disp: 270 capsule, Rfl: 1    glucose blood (Contour Test) test strip, Use to test blood sugar twice daily E11.65, Disp: 200 each, Rfl: 5    isosorbide mononitrate (IMDUR) 60 MG 24 hr tablet, TAKE 1 TABLET BY MOUTH DAILY, Disp: 90 tablet, Rfl: 1    " Janumet XR  MG tablet, TAKE 1 TABLET BY MOUTH TWICE DAILY, Disp: 180 tablet, Rfl: 0    Lancets Thin misc, LANCETS THIN, Disp: , Rfl:     metoprolol succinate XL (TOPROL-XL) 50 MG 24 hr tablet, Take 1 tablet by mouth Daily., Disp: 90 tablet, Rfl: 3    Omega-3 Fatty Acids (fish oil) 1000 MG capsule capsule, Take  by mouth Daily With Breakfast., Disp: , Rfl:     OTEZLA 30 MG tablet, Take 30 mg by mouth Daily., Disp: , Rfl:     simvastatin (ZOCOR) 40 MG tablet, TAKE 1 TABLET BY MOUTH EVERY EVENING FOR HIGH CHOLESTEROL, Disp: 90 tablet, Rfl: 3    sucralfate (Carafate) 1 g tablet, Take 1 tablet by mouth 4 (Four) Times a Day., Disp: 360 tablet, Rfl: 1    azithromycin (Zithromax Z-Magan) 250 MG tablet, Take 2 tablets by mouth on day 1, then 1 tablet daily on days 2-5, Disp: 6 tablet, Rfl: 0    fluconazole (Diflucan) 100 MG tablet, Take 1 tablet by mouth Daily., Disp: 7 tablet, Rfl: 0    HYDROcodone-acetaminophen (Norco) 7.5-325 MG per tablet, Take 1 tablet by mouth Every 6 (Six) Hours As Needed for Moderate Pain . (Patient not taking: Reported on 6/12/2023), Disp: 28 tablet, Rfl: 0    nystatin (MYCOSTATIN) 737979 UNIT/GM cream, Apply 1 application topically to the appropriate area as directed 2 (Two) Times a Day., Disp: 60 g, Rfl: 0    Objective   Physical Exam  Vitals and nursing note reviewed.   Constitutional:       General: She is not in acute distress.     Appearance: She is well-developed.   HENT:      Head: Normocephalic and atraumatic.      Right Ear: Tympanic membrane and external ear normal. There is no impacted cerumen.      Left Ear: Tympanic membrane and external ear normal. There is no impacted cerumen.      Nose: Congestion present.      Mouth/Throat:      Pharynx: Posterior oropharyngeal erythema present. No oropharyngeal exudate.   Eyes:      Conjunctiva/sclera: Conjunctivae normal.      Pupils: Pupils are equal, round, and reactive to light.   Cardiovascular:      Rate and Rhythm: Normal rate and  regular rhythm.      Heart sounds: Normal heart sounds.   Pulmonary:      Effort: Pulmonary effort is normal. No respiratory distress.      Breath sounds: Normal breath sounds. No wheezing or rales.   Musculoskeletal:      Cervical back: Normal range of motion and neck supple.   Skin:     General: Skin is warm and dry.      Findings: Rash present.      Comments: There is macular rash and skin irritation noted under the abdominal panniculus spreading into the both groin areas.  No signs of infection.  No petechiae.       Assessment & Plan   Diagnoses and all orders for this visit:    1. Upper respiratory tract infection, unspecified type (Primary)  -     azithromycin (Zithromax Z-Magan) 250 MG tablet; Take 2 tablets by mouth on day 1, then 1 tablet daily on days 2-5  Dispense: 6 tablet; Refill: 0    2. Intertrigo  -     fluconazole (Diflucan) 100 MG tablet; Take 1 tablet by mouth Daily.  Dispense: 7 tablet; Refill: 0  -     nystatin (MYCOSTATIN) [id number] UNIT/GM cream; Apply 1 application topically to the appropriate area as directed 2 (Two) Times a Day.  Dispense: 60 g; Refill: 0      We will be starting her on Z-Magan.  She may also take over-the-counter medication for symptom control.  I also addressed her skin issues and I will be starting her on oral antifungal medicine as well as topical antifungal medication.  She is to monitor the symptoms and contact us back if no improvement.      Return if symptoms worsen or fail to improve, for Recheck.    Requested Prescriptions     Signed Prescriptions Disp Refills    azithromycin (Zithromax Z-Magan) 250 MG tablet 6 tablet 0     Sig: Take 2 tablets by mouth on day 1, then 1 tablet daily on days 2-5    fluconazole (Diflucan) 100 MG tablet 7 tablet 0     Sig: Take 1 tablet by mouth Daily.    nystatin (MYCOSTATIN) [id number] UNIT/GM cream 60 g 0     Sig: Apply 1 application topically to the appropriate area as directed 2 (Two) Times a Day.

## 2023-08-09 ENCOUNTER — OFFICE VISIT (OUTPATIENT)
Dept: FAMILY MEDICINE CLINIC | Facility: CLINIC | Age: 68
End: 2023-08-09
Payer: MEDICARE

## 2023-08-09 VITALS
OXYGEN SATURATION: 95 % | SYSTOLIC BLOOD PRESSURE: 153 MMHG | DIASTOLIC BLOOD PRESSURE: 77 MMHG | BODY MASS INDEX: 26.15 KG/M2 | HEART RATE: 68 BPM | TEMPERATURE: 97.1 F | WEIGHT: 153.2 LBS | HEIGHT: 64 IN

## 2023-08-09 DIAGNOSIS — K13.79 MOUTH PAIN: Primary | ICD-10-CM

## 2023-08-09 DIAGNOSIS — E11.9 TYPE 2 DIABETES MELLITUS WITHOUT COMPLICATION, WITHOUT LONG-TERM CURRENT USE OF INSULIN: ICD-10-CM

## 2023-08-09 RX ORDER — PENICILLIN V POTASSIUM 500 MG/1
500 TABLET ORAL 3 TIMES DAILY
Qty: 30 TABLET | Refills: 0 | Status: SHIPPED | OUTPATIENT
Start: 2023-08-09

## 2023-08-09 NOTE — PROGRESS NOTES
"Chief Complaint  Dental Pain (X 4 days -Wanting a antibiotic )    Subjective        Tania Blunt presents to [facility]  Dental Pain   This is a new problem. The current episode started in the past 7 days. The problem occurs constantly. The problem has been gradually worsening. The pain is moderate. Associated symptoms include thermal sensitivity. Pertinent negatives include no difficulty swallowing, fever, oral bleeding or sinus pressure. She has tried nothing for the symptoms.     Objective   Vital Signs:  /77 (BP Location: Left arm, Patient Position: Sitting, Cuff Size: Adult)   Pulse 68   Temp 97.1 øF (36.2 øC) (Infrared)   Ht 162.6 cm (64\")   Wt 69.5 kg (153 lb 3.2 oz)   SpO2 95%   BMI 26.30 kg/mý   Estimated body mass index is 26.3 kg/mý as calculated from the following:    Height as of this encounter: 162.6 cm (64\").    Weight as of this encounter: 69.5 kg (153 lb 3.2 oz).     BMI is >= 25 and <30. (Overweight) The following options were offered after discussion;: exercise counseling/recommendations           Physical Exam  Vitals and nursing note reviewed.   Constitutional:       General: She is not in acute distress.     Appearance: She is well-developed.   HENT:      Head: Normocephalic.      Mouth/Throat:      Dentition: Dental tenderness and gingival swelling present.      Pharynx: Oropharynx is clear.   Eyes:      General: Lids are normal.      Conjunctiva/sclera: Conjunctivae normal.   Neck:      Thyroid: No thyroid mass or thyromegaly.      Trachea: Trachea normal.   Cardiovascular:      Rate and Rhythm: Normal rate and regular rhythm.      Heart sounds: Normal heart sounds.   Pulmonary:      Effort: Pulmonary effort is normal.      Breath sounds: Normal breath sounds.   Musculoskeletal:      Cervical back: Normal range of motion.   Lymphadenopathy:      Cervical: No cervical adenopathy.   Skin:     General: Skin is warm and dry.   Neurological:      " Mental Status: She is alert and oriented to person, place, and time.   Psychiatric:         Attention and Perception: She is attentive.         Mood and Affect: Mood normal.         Speech: Speech normal.         Behavior: Behavior normal.      Result Review :  The following data was reviewed by: Arti Aceves MD on 08/09/2023:  Common labs          9/14/2022    09:11   Common Labs   Glucose 152    BUN 16    Creatinine 0.94    Sodium 138    Potassium 4.5    Chloride 102    Calcium 9.6    Albumin 3.70    Total Bilirubin 0.3    Alkaline Phosphatase 87    AST (SGOT) 8    ALT (SGPT) 7    Total Cholesterol 152    Triglycerides 281    HDL Cholesterol 44    LDL Cholesterol  63    Hemoglobin A1C 7.6                   Assessment and Plan   Diagnoses and all orders for this visit:    1. Mouth pain (Primary)  -     penicillin v potassium (VEETID) 500 MG tablet; Take 1 tablet by mouth 3 (Three) Times a Day.  Dispense: 30 tablet; Refill: 0    2. Type 2 diabetes mellitus without complication, without long-term current use of insulin  -     Comprehensive Metabolic Panel  -     Lipid Panel  -     Hemoglobin A1c  -     Microalbumin / Creatinine Urine Ratio - Urine, Clean Catch             Follow Up   No follow-ups on file.  Patient was given instructions and counseling regarding her condition or for health maintenance advice. Please see specific information pulled into the AVS if appropriate.       Answers submitted by the patient for this visit:  Other (Submitted on 8/8/2023)  Please describe your symptoms.: tooth pain  Have you had these symptoms before?: No  How long have you been having these symptoms?: 1-4 days  Please list any medications you are currently taking for this condition.: n/a  Please describe any probable cause for these symptoms. : abcess tooth  Primary Reason for Visit (Submitted on 8/8/2023)  What is the primary reason for your visit?: Other

## 2023-08-15 ENCOUNTER — LAB (OUTPATIENT)
Dept: FAMILY MEDICINE CLINIC | Facility: CLINIC | Age: 68
End: 2023-08-15
Payer: MEDICARE

## 2023-08-15 LAB
ALBUMIN SERPL-MCNC: 3.9 G/DL (ref 3.5–5.2)
ALBUMIN UR-MCNC: 189.2 MG/DL
ALBUMIN/GLOB SERPL: 1.2 G/DL
ALP SERPL-CCNC: 84 U/L (ref 39–117)
ALT SERPL W P-5'-P-CCNC: 6 U/L (ref 1–33)
ANION GAP SERPL CALCULATED.3IONS-SCNC: 9 MMOL/L (ref 5–15)
AST SERPL-CCNC: 13 U/L (ref 1–32)
BILIRUB SERPL-MCNC: 0.2 MG/DL (ref 0–1.2)
BUN SERPL-MCNC: 17 MG/DL (ref 8–23)
BUN/CREAT SERPL: 17.3 (ref 7–25)
CALCIUM SPEC-SCNC: 9.6 MG/DL (ref 8.6–10.5)
CHLORIDE SERPL-SCNC: 102 MMOL/L (ref 98–107)
CHOLEST SERPL-MCNC: 162 MG/DL (ref 0–200)
CO2 SERPL-SCNC: 26 MMOL/L (ref 22–29)
CREAT SERPL-MCNC: 0.98 MG/DL (ref 0.57–1)
CREAT UR-MCNC: 98.5 MG/DL
EGFRCR SERPLBLD CKD-EPI 2021: 63 ML/MIN/1.73
GLOBULIN UR ELPH-MCNC: 3.3 GM/DL
GLUCOSE SERPL-MCNC: 117 MG/DL (ref 65–99)
HBA1C MFR BLD: 7.5 % (ref 4.8–5.6)
HDLC SERPL-MCNC: 45 MG/DL (ref 40–60)
LDLC SERPL CALC-MCNC: 81 MG/DL (ref 0–100)
LDLC/HDLC SERPL: 1.64 {RATIO}
MICROALBUMIN/CREAT UR: 1920.8 MG/G
POTASSIUM SERPL-SCNC: 5.5 MMOL/L (ref 3.5–5.2)
PROT SERPL-MCNC: 7.2 G/DL (ref 6–8.5)
SODIUM SERPL-SCNC: 137 MMOL/L (ref 136–145)
TRIGL SERPL-MCNC: 216 MG/DL (ref 0–150)
VLDLC SERPL-MCNC: 36 MG/DL (ref 5–40)

## 2023-08-15 PROCEDURE — 80053 COMPREHEN METABOLIC PANEL: CPT | Performed by: FAMILY MEDICINE

## 2023-08-15 PROCEDURE — 83036 HEMOGLOBIN GLYCOSYLATED A1C: CPT | Performed by: FAMILY MEDICINE

## 2023-08-15 PROCEDURE — 82043 UR ALBUMIN QUANTITATIVE: CPT | Performed by: FAMILY MEDICINE

## 2023-08-15 PROCEDURE — 82570 ASSAY OF URINE CREATININE: CPT | Performed by: FAMILY MEDICINE

## 2023-08-15 PROCEDURE — 80061 LIPID PANEL: CPT | Performed by: FAMILY MEDICINE

## 2023-08-29 ENCOUNTER — TELEPHONE (OUTPATIENT)
Dept: FAMILY MEDICINE CLINIC | Facility: CLINIC | Age: 68
End: 2023-08-29

## 2023-08-29 ENCOUNTER — OFFICE VISIT (OUTPATIENT)
Dept: CARDIOLOGY | Facility: CLINIC | Age: 68
End: 2023-08-29
Payer: MEDICARE

## 2023-08-29 ENCOUNTER — TELEPHONE (OUTPATIENT)
Dept: CARDIOLOGY | Facility: CLINIC | Age: 68
End: 2023-08-29

## 2023-08-29 VITALS
HEART RATE: 81 BPM | HEIGHT: 64 IN | DIASTOLIC BLOOD PRESSURE: 56 MMHG | SYSTOLIC BLOOD PRESSURE: 130 MMHG | OXYGEN SATURATION: 97 % | WEIGHT: 152 LBS | BODY MASS INDEX: 25.95 KG/M2

## 2023-08-29 DIAGNOSIS — E11.65 TYPE 2 DIABETES MELLITUS WITH HYPERGLYCEMIA, WITHOUT LONG-TERM CURRENT USE OF INSULIN: ICD-10-CM

## 2023-08-29 DIAGNOSIS — E78.5 DYSLIPIDEMIA: ICD-10-CM

## 2023-08-29 DIAGNOSIS — I25.10 CORONARY ARTERY DISEASE INVOLVING NATIVE CORONARY ARTERY OF NATIVE HEART WITHOUT ANGINA PECTORIS: Primary | ICD-10-CM

## 2023-08-29 DIAGNOSIS — I73.9 PVD (PERIPHERAL VASCULAR DISEASE) WITH CLAUDICATION: ICD-10-CM

## 2023-08-29 DIAGNOSIS — R60.0 EDEMA LEG: ICD-10-CM

## 2023-08-29 DIAGNOSIS — I10 ESSENTIAL HYPERTENSION: ICD-10-CM

## 2023-08-29 PROCEDURE — 1159F MED LIST DOCD IN RCRD: CPT | Performed by: INTERNAL MEDICINE

## 2023-08-29 PROCEDURE — 3075F SYST BP GE 130 - 139MM HG: CPT | Performed by: INTERNAL MEDICINE

## 2023-08-29 PROCEDURE — 99214 OFFICE O/P EST MOD 30 MIN: CPT | Performed by: INTERNAL MEDICINE

## 2023-08-29 PROCEDURE — 93000 ELECTROCARDIOGRAM COMPLETE: CPT | Performed by: INTERNAL MEDICINE

## 2023-08-29 PROCEDURE — 3078F DIAST BP <80 MM HG: CPT | Performed by: INTERNAL MEDICINE

## 2023-08-29 PROCEDURE — 1160F RVW MEDS BY RX/DR IN RCRD: CPT | Performed by: INTERNAL MEDICINE

## 2023-08-29 NOTE — PROGRESS NOTES
Subjective:     Encounter Date:08/29/2023      Patient ID: Tania Blunt is a 68 y.o. female.    Chief Complaint and history of present illness:     Follow for CAD, hypertension, dyslipidemia, diabetes     History of Present Illness  :     Ms. Tania Blunt  has PMH of     CAD, cardiac cath 4/2013 revealing 50% ostial RCA and 40% ostial LCx disease.  Hypertension  Hyperlipidemia  Diabetes  BIJU  Psoriasis  Histoplasmosis  Cholecystectomy and tubal ligation  Family history of heart disease in maternal grandmother  Cigarette smoker     Here for follow-up..  Patient denies any chest pain or shortness of breath.  Patient is here for an acute visit for leg edema.  Is complaining of leg cramps sometimes with exertion sometimes at nighttime.     Patient's arterial blood pressure is 136/56, heart rate 81 bpm, O2 sat of 97% on room air.     Labs from 4/13/2021 reveal CMP with a creatinine of 1.01 GFR 55 glucose 148.  Cholesterol 146 triglycerides 374 HDL 41 LDL 49.  A1c from 9/15/2020 was 9.5.  Labs from 8/15/2023 revealed normal CMP except potassium of 5.5 (hemolyzed specimen), glucose 117, A1c 7.5.  Lipid profile with cholesterol 162, triglycerides 216, HDL 45, LDL 81.        Assessment :     Leg edema  Claudication  CAD  Hypertension  Dyslipidemia  Diabetes  BIJU  Obesity BMI over 30--now decreased to 26     Recommendations and plans :     Reviewed EKG results with patient.  Continue medical management with aspirin, amlodipine, isosorbide, metoprolol succinate, simvastatin as tolerated to help with hypertension CAD dyslipidemia .  Follow-up with PMD for diabetes care.  We will check a BNP level and echo to evaluate edema.  If those are negative we will try to decrease amlodipine.  We will check ABIs to check claudication.  Counseled on smoking cessation.                 ECG 12 Lead    Date/Time: 8/29/2023 10:26 AM  Performed by: Stanton Reddy MD  Authorized by: Stanton Reddy MD    Comparison: compared with previous ECG from 1/4/2023  Comparison to previous ECG: EKG done today reviewed/elevated by me reveals sinus rhythm with rate of 81 bpm, no new change compared EKG from 1/4/2023        Copied text in this portion of the note has been reviewed and is accurate as of 8/29/2023  The following portions of the patient's history were reviewed and updated as appropriate: allergies, current medications, past family history, past medical history, past social history, past surgical history and problem list.    Assessment:         [facility]       Diagnosis Plan   1. Coronary artery disease involving native coronary artery of native heart without angina pectoris  ECG 12 Lead    Adult Transthoracic Echo Complete W/ Cont if Necessary Per Protocol    BNP      2. Essential hypertension  ECG 12 Lead    Adult Transthoracic Echo Complete W/ Cont if Necessary Per Protocol    BNP      3. Dyslipidemia  ECG 12 Lead    Adult Transthoracic Echo Complete W/ Cont if Necessary Per Protocol    BNP      4. Type 2 diabetes mellitus with hyperglycemia, without long-term current use of insulin  ECG 12 Lead    Adult Transthoracic Echo Complete W/ Cont if Necessary Per Protocol    BNP      5. PVD (peripheral vascular disease) with claudication  Doppler Ankle Brachial Index Single Level CAR    Adult Transthoracic Echo Complete W/ Cont if Necessary Per Protocol    BNP      6. Edema leg  Adult Transthoracic Echo Complete W/ Cont if Necessary Per Protocol    BNP             Plan:               Past Medical History:  Past Medical History:   Diagnosis Date    Arthritis     CAD (coronary artery disease)     Diabetes     GERD (gastroesophageal reflux disease)     Heart disease     Hypertension     Hypoxemia      Past Surgical History:  Past Surgical History:   Procedure Laterality Date    APPENDECTOMY      CARDIAC CATHETERIZATION  07/29/2015    No Intervention: Disease noted RCA & Circumflex Artery    CATARACT EXTRACTION Bilateral      COLONOSCOPY  09/21/2020    Cologuard negative.    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    TUBAL ABDOMINAL LIGATION Bilateral       Allergies:  No Known Allergies  Home Meds:  Current Meds:     Current Outpatient Medications:     amLODIPine (NORVASC) 10 MG tablet, Take 1 tablet by mouth Daily., Disp: 90 tablet, Rfl: 3    aspirin 81 MG tablet, Take 1 tablet by mouth 2 (Two) Times a Day., Disp: , Rfl:     dexlansoprazole (DEXILANT) 60 MG capsule, TAKE 1 CAPSULE BY MOUTH DAILY, Disp: 90 capsule, Rfl: 1    fluconazole (Diflucan) 100 MG tablet, Take 1 tablet by mouth Daily., Disp: 7 tablet, Rfl: 0    gabapentin (NEURONTIN) 400 MG capsule, TAKE 1 CAPSULE BY MOUTH THREE TIMES DAILY FOR NERVE PAIN, Disp: 270 capsule, Rfl: 1    glucose blood (Contour Test) test strip, Use to test blood sugar twice daily E11.65, Disp: 200 each, Rfl: 5    isosorbide mononitrate (IMDUR) 60 MG 24 hr tablet, TAKE 1 TABLET BY MOUTH DAILY, Disp: 90 tablet, Rfl: 1    Janumet XR  MG tablet, TAKE 1 TABLET BY MOUTH TWICE DAILY, Disp: 180 tablet, Rfl: 0    Lancets Thin misc, LANCETS THIN, Disp: , Rfl:     metoprolol succinate XL (TOPROL-XL) 50 MG 24 hr tablet, Take 1 tablet by mouth Daily., Disp: 90 tablet, Rfl: 3    nystatin (MYCOSTATIN) 939595 UNIT/GM cream, Apply 1 application topically to the appropriate area as directed 2 (Two) Times a Day., Disp: 60 g, Rfl: 0    Omega-3 Fatty Acids (fish oil) 1000 MG capsule capsule, Take  by mouth Daily With Breakfast., Disp: , Rfl:     OTEZLA 30 MG tablet, Take 30 mg by mouth Daily., Disp: , Rfl:     penicillin v potassium (VEETID) 500 MG tablet, Take 1 tablet by mouth 3 (Three) Times a Day., Disp: 30 tablet, Rfl: 0    simvastatin (ZOCOR) 40 MG tablet, TAKE 1 TABLET BY MOUTH EVERY EVENING FOR HIGH CHOLESTEROL, Disp: 90 tablet, Rfl: 3    sucralfate (Carafate) 1 g tablet, Take 1 tablet by mouth 4 (Four) Times a Day., Disp: 360 tablet, Rfl: 1  Social History:  "  Social History     Tobacco Use    Smoking status: Every Day     Packs/day: 1.00     Years: 50.00     Pack years: 50.00     Types: Cigarettes    Smokeless tobacco: Never   Substance Use Topics    Alcohol use: No     Comment: drinks caffeine free sodas      Family History:  Family History   Problem Relation Age of Onset    Hypertension Mother     Colon cancer Mother     Lung cancer Father     Heart disease Son     Heart disease Maternal Grandmother               Review of Systems   Cardiovascular:  Positive for leg swelling. Negative for chest pain and palpitations.   Respiratory:  Negative for shortness of breath.    Neurological:  Negative for dizziness and numbness.   All other systems are negative         Objective:     Physical Exam  /56 (BP Location: Left arm, Patient Position: Sitting, Cuff Size: Adult)   Pulse 81   Ht 162.6 cm (64\")   Wt 68.9 kg (152 lb)   SpO2 97%   BMI 26.09 kg/mý   General:  Appears in no acute distress  Eyes: Sclera is anicteric,  conjunctiva is clear   HEENT:  No JVD.  No carotid bruits  Respiratory: Respirations regular and unlabored at rest.  Clear to auscultation  Cardiovascular: S1,S2 Regular rate and rhythm. No murmur, rub or gallop auscultated.   Extremities: No digital clubbing or cyanosis, no edema  Skin: Color pink. Skin warm and dry to touch. No rashes  No xanthoma  Neuro: Alert and awake.    Lab Reviewed:         Stanton Reddy MD  8/29/2023 10:36 EDT      EMR Dragon/Transcription:   \"Dictated utilizing Dragon dictation\".        "

## 2023-08-29 NOTE — TELEPHONE ENCOUNTER
Caller:     Tania Blunt (Self) [phone] (Mobile)       What was the call regarding: DR KESSLER PUT LAB ORDERS IN EPIC AND PATIENT WANTED TO KNOW IF OUR OFFICE CAN  DO  THE LABS FOR HER     Is it okay if the provider responds through MyChart: CALL AND ADVISE

## 2023-08-29 NOTE — TELEPHONE ENCOUNTER
Echos are currently scheduled into October here. Patient wanted to have it done sooner so she is scheduled at [place] for 9/6. If patient needs a follow up after testing please let me know and we will get her scheduled. Thank you.

## 2023-09-06 ENCOUNTER — HOSPITAL ENCOUNTER (OUTPATIENT)
Dept: CARDIOLOGY | Facility: HOSPITAL | Age: 68
Discharge: HOME | End: 2023-09-06
Payer: MEDICARE

## 2023-09-06 ENCOUNTER — LAB (OUTPATIENT)
Dept: LAB | Facility: HOSPITAL | Age: 68
End: 2023-09-06
Payer: MEDICARE

## 2023-09-06 VITALS
SYSTOLIC BLOOD PRESSURE: 131 MMHG | WEIGHT: 152 LBS | DIASTOLIC BLOOD PRESSURE: 56 MMHG | HEIGHT: 64 IN | HEART RATE: 81 BPM | BODY MASS INDEX: 25.95 KG/M2

## 2023-09-06 DIAGNOSIS — I10 ESSENTIAL HYPERTENSION: ICD-10-CM

## 2023-09-06 DIAGNOSIS — I25.10 CORONARY ARTERY DISEASE INVOLVING NATIVE CORONARY ARTERY OF NATIVE HEART WITHOUT ANGINA PECTORIS: ICD-10-CM

## 2023-09-06 DIAGNOSIS — I73.9 PVD (PERIPHERAL VASCULAR DISEASE) WITH CLAUDICATION: ICD-10-CM

## 2023-09-06 DIAGNOSIS — R60.0 EDEMA LEG: ICD-10-CM

## 2023-09-06 DIAGNOSIS — E11.65 TYPE 2 DIABETES MELLITUS WITH HYPERGLYCEMIA, WITHOUT LONG-TERM CURRENT USE OF INSULIN: ICD-10-CM

## 2023-09-06 DIAGNOSIS — E78.5 DYSLIPIDEMIA: ICD-10-CM

## 2023-09-06 LAB — NT-PROBNP SERPL-MCNC: 311 PG/ML (ref 0–900)

## 2023-09-06 PROCEDURE — 36415 COLL VENOUS BLD VENIPUNCTURE: CPT

## 2023-09-06 PROCEDURE — 93306 TTE W/DOPPLER COMPLETE: CPT

## 2023-09-06 PROCEDURE — 93306 TTE W/DOPPLER COMPLETE: CPT | Performed by: INTERNAL MEDICINE

## 2023-09-06 PROCEDURE — 83880 ASSAY OF NATRIURETIC PEPTIDE: CPT

## 2023-09-08 LAB
BH CV ECHO MEAS - ACS: 1.96 CM
BH CV ECHO MEAS - AI P1/2T: 430.5 MSEC
BH CV ECHO MEAS - AO MAX PG: 8.9 MMHG
BH CV ECHO MEAS - AO MEAN PG: 4.9 MMHG
BH CV ECHO MEAS - AO ROOT DIAM: 3.1 CM
BH CV ECHO MEAS - AO V2 MAX: 148.9 CM/SEC
BH CV ECHO MEAS - AO V2 VTI: 31.4 CM
BH CV ECHO MEAS - AVA(I,D): 2 CM2
BH CV ECHO MEAS - EDV(CUBED): 92.4 ML
BH CV ECHO MEAS - EDV(MOD-SP4): 80.6 ML
BH CV ECHO MEAS - EF(MOD-BP): 65.2 %
BH CV ECHO MEAS - EF(MOD-SP4): 65.2 %
BH CV ECHO MEAS - ESV(CUBED): 20.8 ML
BH CV ECHO MEAS - ESV(MOD-SP4): 28.1 ML
BH CV ECHO MEAS - FS: 39.2 %
BH CV ECHO MEAS - IVS/LVPW: 0.87 CM
BH CV ECHO MEAS - IVSD: 0.87 CM
BH CV ECHO MEAS - LA DIMENSION: 3.5 CM
BH CV ECHO MEAS - LV DIASTOLIC VOL/BSA (35-75): 46.3 CM2
BH CV ECHO MEAS - LV MASS(C)D: 141.8 GRAMS
BH CV ECHO MEAS - LV MAX PG: 3 MMHG
BH CV ECHO MEAS - LV MEAN PG: 1.71 MMHG
BH CV ECHO MEAS - LV SYSTOLIC VOL/BSA (12-30): 16.1 CM2
BH CV ECHO MEAS - LV V1 MAX: 87.3 CM/SEC
BH CV ECHO MEAS - LV V1 VTI: 19.7 CM
BH CV ECHO MEAS - LVIDD: 4.5 CM
BH CV ECHO MEAS - LVIDS: 2.8 CM
BH CV ECHO MEAS - LVOT AREA: 3.2 CM2
BH CV ECHO MEAS - LVOT DIAM: 2.01 CM
BH CV ECHO MEAS - LVPWD: 1 CM
BH CV ECHO MEAS - MV A MAX VEL: 106.2 CM/SEC
BH CV ECHO MEAS - MV DEC SLOPE: 440.4 CM/SEC2
BH CV ECHO MEAS - MV DEC TIME: 0.23 MSEC
BH CV ECHO MEAS - MV E MAX VEL: 102.1 CM/SEC
BH CV ECHO MEAS - MV E/A: 0.96
BH CV ECHO MEAS - MV MAX PG: 5.4 MMHG
BH CV ECHO MEAS - MV MEAN PG: 2.9 MMHG
BH CV ECHO MEAS - MV V2 VTI: 28.6 CM
BH CV ECHO MEAS - MVA(VTI): 2.19 CM2
BH CV ECHO MEAS - PA ACC TIME: 0.13 SEC
BH CV ECHO MEAS - PA V2 MAX: 100.3 CM/SEC
BH CV ECHO MEAS - PI END-D VEL: 114.2 CM/SEC
BH CV ECHO MEAS - PULM A REVS VEL: 38 CM/SEC
BH CV ECHO MEAS - PULM DIAS VEL: 70.8 CM/SEC
BH CV ECHO MEAS - PULM S/D: 0.99
BH CV ECHO MEAS - PULM SYS VEL: 70.2 CM/SEC
BH CV ECHO MEAS - RV MAX PG: 2.8 MMHG
BH CV ECHO MEAS - RV V1 MAX: 84.3 CM/SEC
BH CV ECHO MEAS - RV V1 VTI: 18.5 CM
BH CV ECHO MEAS - RVDD: 2.7 CM
BH CV ECHO MEAS - SI(MOD-SP4): 30.2 ML/M2
BH CV ECHO MEAS - SV(LVOT): 62.7 ML
BH CV ECHO MEAS - SV(MOD-SP4): 52.5 ML
BH CV ECHO MEAS - TR MAX PG: 25.7 MMHG
BH CV ECHO MEAS - TR MAX VEL: 253.1 CM/SEC

## 2023-09-11 NOTE — TELEPHONE ENCOUNTER
Echo results in chart     Pt has [facility] scheduled for 9/29    When should pt follow up?     She has a appt scheduled for next year

## 2023-10-05 RX ORDER — ISOSORBIDE MONONITRATE 60 MG/1
60 TABLET, EXTENDED RELEASE ORAL EVERY 24 HOURS
Qty: 90 TABLET | Refills: 1 | Status: SHIPPED | OUTPATIENT
Start: 2023-10-05

## 2023-10-10 ENCOUNTER — HOSPITAL ENCOUNTER (OUTPATIENT)
Dept: CARDIOLOGY | Facility: HOSPITAL | Age: 68
Discharge: HOME | End: 2023-10-10
Admitting: INTERNAL MEDICINE
Payer: MEDICARE

## 2023-10-10 DIAGNOSIS — I73.9 PVD (PERIPHERAL VASCULAR DISEASE) WITH CLAUDICATION: ICD-10-CM

## 2023-10-10 LAB
BH CV LOWER ARTERIAL LEFT ABI RATIO: 1.08
BH CV LOWER ARTERIAL LEFT DORSALIS PEDIS SYS MAX: 132
BH CV LOWER ARTERIAL LEFT GREAT TOE SYS MAX: 111
BH CV LOWER ARTERIAL LEFT POST TIBIAL SYS MAX: 145
BH CV LOWER ARTERIAL LEFT TBI RATIO: 0.83
BH CV LOWER ARTERIAL RIGHT ABI RATIO: 1.14
BH CV LOWER ARTERIAL RIGHT DORSALIS PEDIS SYS MAX: 153
BH CV LOWER ARTERIAL RIGHT GREAT TOE SYS MAX: 113
BH CV LOWER ARTERIAL RIGHT POST TIBIAL SYS MAX: 142
BH CV LOWER ARTERIAL RIGHT TBI RATIO: 0.84
UPPER ARTERIAL LEFT ARM BRACHIAL SYS MAX: 124
UPPER ARTERIAL RIGHT ARM BRACHIAL SYS MAX: 134

## 2023-10-10 PROCEDURE — 93922 UPR/L XTREMITY ART 2 LEVELS: CPT

## 2023-10-11 ENCOUNTER — TELEPHONE (OUTPATIENT)
Dept: CARDIOLOGY | Facility: CLINIC | Age: 68
End: 2023-10-11
Payer: MEDICARE

## 2023-11-20 RX ORDER — SIMVASTATIN 40 MG
TABLET ORAL
Qty: 90 TABLET | Refills: 3 | Status: SHIPPED | OUTPATIENT
Start: 2023-11-20

## 2023-12-04 ENCOUNTER — OFFICE VISIT (OUTPATIENT)
Dept: FAMILY MEDICINE CLINIC | Facility: CLINIC | Age: 68
End: 2023-12-04
Payer: MEDICARE

## 2023-12-04 VITALS
BODY MASS INDEX: 26.15 KG/M2 | WEIGHT: 153.2 LBS | HEIGHT: 64 IN | DIASTOLIC BLOOD PRESSURE: 64 MMHG | TEMPERATURE: 98.4 F | HEART RATE: 78 BPM | OXYGEN SATURATION: 93 % | SYSTOLIC BLOOD PRESSURE: 118 MMHG

## 2023-12-04 DIAGNOSIS — R60.0 PEDAL EDEMA: Primary | ICD-10-CM

## 2023-12-04 DIAGNOSIS — E11.9 TYPE 2 DIABETES MELLITUS WITHOUT COMPLICATION, WITHOUT LONG-TERM CURRENT USE OF INSULIN: ICD-10-CM

## 2023-12-04 DIAGNOSIS — I10 PRIMARY HYPERTENSION: ICD-10-CM

## 2023-12-04 PROCEDURE — 80053 COMPREHEN METABOLIC PANEL: CPT | Performed by: FAMILY MEDICINE

## 2023-12-04 PROCEDURE — 36415 COLL VENOUS BLD VENIPUNCTURE: CPT | Performed by: FAMILY MEDICINE

## 2023-12-04 PROCEDURE — 83036 HEMOGLOBIN GLYCOSYLATED A1C: CPT | Performed by: FAMILY MEDICINE

## 2023-12-04 NOTE — PROGRESS NOTES
"Chief Complaint  Leg Swelling (And ankles x 1 month - seen Dr. Gilman and test came back showing it was not her heart )    Subjective        Tania Blunt presents to [facility]  History of Present Illness  She has already been to see her cardiologist who did an echo which showed only minimal abnormalities.    Leg Swelling  This is a new problem. The current episode started more than 1 month ago. The problem occurs daily. The problem has been unchanged. Pertinent negatives include no change in bowel habit, chest pain, chills, congestion, coughing, fever, swollen glands or urinary symptoms. Nothing aggravates the symptoms.   Hypertension  This is a chronic problem. The current episode started more than 1 year ago. The problem has been gradually improving since onset. The problem is controlled. Associated symptoms include peripheral edema. Pertinent negatives include no chest pain or shortness of breath. There are no associated agents to hypertension. The current treatment provides moderate improvement.   Diabetes  She presents for her follow-up diabetic visit. She has type 2 diabetes mellitus. There are no hypoglycemic associated symptoms. Pertinent negatives for diabetes include no chest pain, no foot ulcerations, no polydipsia, no polyphagia and no polyuria. There are no hypoglycemic complications. Symptoms are stable. There are no diabetic complications.       Objective   Vital Signs:  /64 (BP Location: Left arm, Patient Position: Sitting, Cuff Size: Adult)   Pulse 78   Temp 98.4 °F (36.9 °C) (Infrared)   Ht 162.6 cm (64\")   Wt 69.5 kg (153 lb 3.2 oz)   SpO2 93%   BMI 26.30 kg/m²   Estimated body mass index is 26.3 kg/m² as calculated from the following:    Height as of this encounter: 162.6 cm (64\").    Weight as of this encounter: 69.5 kg (153 lb 3.2 oz).     BMI is >= 25 and <30. (Overweight) The following options were offered after discussion;: exercise " counseling/recommendations           Physical Exam  Vitals and nursing note reviewed.   Constitutional:       General: She is not in acute distress.     Appearance: She is well-developed.   HENT:      Head: Normocephalic.   Eyes:      General: Lids are normal.      Conjunctiva/sclera: Conjunctivae normal.   Neck:      Thyroid: No thyroid mass or thyromegaly.      Trachea: Trachea normal.   Cardiovascular:      Rate and Rhythm: Normal rate and regular rhythm.      Heart sounds: Normal heart sounds.   Pulmonary:      Effort: Pulmonary effort is normal.      Breath sounds: Normal breath sounds.   Musculoskeletal:      Cervical back: Normal range of motion.      Right lower leg: Edema present.      Left lower leg: Edema present.   Lymphadenopathy:      Cervical: No cervical adenopathy.   Skin:     General: Skin is warm and dry.   Neurological:      Mental Status: She is alert and oriented to person, place, and time.   Psychiatric:         Attention and Perception: She is attentive.         Mood and Affect: Mood normal.         Speech: Speech normal.         Behavior: Behavior normal.        Result Review :  The following data was reviewed by: Arti Aceves MD on 12/04/2023:  Common labs          8/15/2023    08:07   Common Labs   Glucose 117    BUN 17    Creatinine 0.98    Sodium 137    Potassium 5.5    Chloride 102    Calcium 9.6    Albumin 3.9    Total Bilirubin 0.2    Alkaline Phosphatase 84    AST (SGOT) 13    ALT (SGPT) 6    Total Cholesterol 162    Triglycerides 216    HDL Cholesterol 45    LDL Cholesterol  81    Hemoglobin A1C 7.50    Microalbumin, Urine 189.2                   Assessment and Plan   Diagnoses and all orders for this visit:    1. Pedal edema (Primary)  Assessment & Plan:  Check labs as ordered.  If renal function is normal then plan to initiate diuretic.    Orders:  -     Comprehensive Metabolic Panel  -     Hemoglobin A1c    2. Type 2 diabetes mellitus without complication, without long-term  current use of insulin  -     Comprehensive Metabolic Panel  -     Hemoglobin A1c    3. Primary hypertension  -     Comprehensive Metabolic Panel  -     Hemoglobin A1c             Follow Up   No follow-ups on file.  Patient was given instructions and counseling regarding her condition or for health maintenance advice. Please see specific information pulled into the AVS if appropriate.         Answers submitted by the patient for this visit:  Other (Submitted on 12/1/2023)  Please describe your symptoms.: Swelling in ankles.  Have you had these symptoms before?: No  How long have you been having these symptoms?: Greater than 2 weeks  Primary Reason for Visit (Submitted on 12/1/2023)  What is the primary reason for your visit?: Other

## 2023-12-05 LAB
ALBUMIN SERPL-MCNC: 3.4 G/DL (ref 3.5–5.2)
ALBUMIN/GLOB SERPL: 1 G/DL
ALP SERPL-CCNC: 85 U/L (ref 39–117)
ALT SERPL W P-5'-P-CCNC: 6 U/L (ref 1–33)
ANION GAP SERPL CALCULATED.3IONS-SCNC: 9.2 MMOL/L (ref 5–15)
AST SERPL-CCNC: 12 U/L (ref 1–32)
BILIRUB SERPL-MCNC: 0.3 MG/DL (ref 0–1.2)
BUN SERPL-MCNC: 18 MG/DL (ref 8–23)
BUN/CREAT SERPL: 14.2 (ref 7–25)
CALCIUM SPEC-SCNC: 8.8 MG/DL (ref 8.6–10.5)
CHLORIDE SERPL-SCNC: 103 MMOL/L (ref 98–107)
CO2 SERPL-SCNC: 23.8 MMOL/L (ref 22–29)
CREAT SERPL-MCNC: 1.27 MG/DL (ref 0.57–1)
EGFRCR SERPLBLD CKD-EPI 2021: 46.2 ML/MIN/1.73
GLOBULIN UR ELPH-MCNC: 3.5 GM/DL
GLUCOSE SERPL-MCNC: 224 MG/DL (ref 65–99)
HBA1C MFR BLD: 6.8 % (ref 4.8–5.6)
POTASSIUM SERPL-SCNC: 4.8 MMOL/L (ref 3.5–5.2)
PROT SERPL-MCNC: 6.9 G/DL (ref 6–8.5)
SODIUM SERPL-SCNC: 136 MMOL/L (ref 136–145)

## 2023-12-18 ENCOUNTER — LAB (OUTPATIENT)
Dept: FAMILY MEDICINE CLINIC | Facility: CLINIC | Age: 68
End: 2023-12-18
Payer: MEDICARE

## 2023-12-18 DIAGNOSIS — E11.9 TYPE 2 DIABETES MELLITUS WITHOUT COMPLICATION, WITHOUT LONG-TERM CURRENT USE OF INSULIN: Primary | ICD-10-CM

## 2023-12-18 LAB
ANION GAP SERPL CALCULATED.3IONS-SCNC: 9.2 MMOL/L (ref 5–15)
BUN SERPL-MCNC: 16 MG/DL (ref 8–23)
BUN/CREAT SERPL: 16.2 (ref 7–25)
CALCIUM SPEC-SCNC: 9.2 MG/DL (ref 8.6–10.5)
CHLORIDE SERPL-SCNC: 106 MMOL/L (ref 98–107)
CO2 SERPL-SCNC: 23.8 MMOL/L (ref 22–29)
CREAT SERPL-MCNC: 0.99 MG/DL (ref 0.57–1)
EGFRCR SERPLBLD CKD-EPI 2021: 62.2 ML/MIN/1.73
GLUCOSE SERPL-MCNC: 137 MG/DL (ref 65–99)
POTASSIUM SERPL-SCNC: 4.8 MMOL/L (ref 3.5–5.2)
SODIUM SERPL-SCNC: 139 MMOL/L (ref 136–145)

## 2023-12-18 PROCEDURE — 36415 COLL VENOUS BLD VENIPUNCTURE: CPT | Performed by: FAMILY MEDICINE

## 2023-12-18 PROCEDURE — 80048 BASIC METABOLIC PNL TOTAL CA: CPT | Performed by: FAMILY MEDICINE

## 2023-12-29 RX ORDER — GABAPENTIN 400 MG/1
CAPSULE ORAL
Qty: 270 CAPSULE | Refills: 1 | Status: SHIPPED | OUTPATIENT
Start: 2023-12-29

## 2023-12-29 RX ORDER — METOPROLOL SUCCINATE 50 MG/1
50 TABLET, EXTENDED RELEASE ORAL DAILY
Qty: 90 TABLET | Refills: 3 | Status: SHIPPED | OUTPATIENT
Start: 2023-12-29

## 2023-12-29 RX ORDER — SITAGLIPTIN AND METFORMIN HYDROCHLORIDE 1000; 50 MG/1; MG/1
TABLET, FILM COATED, EXTENDED RELEASE ORAL
Qty: 180 TABLET | Refills: 0 | Status: SHIPPED | OUTPATIENT
Start: 2023-12-29

## 2023-12-29 RX ORDER — AMLODIPINE BESYLATE 10 MG/1
10 TABLET ORAL DAILY
Qty: 90 TABLET | Refills: 3 | Status: SHIPPED | OUTPATIENT
Start: 2023-12-29

## 2023-12-29 NOTE — TELEPHONE ENCOUNTER
Rx Refill Note  Requested Prescriptions     Pending Prescriptions Disp Refills    amLODIPine (NORVASC) 10 MG tablet [Pharmacy Med Name: AMLODIPINE BESYLATE 10MG TABLETS] 90 tablet 3     Sig: TAKE 1 TABLET BY MOUTH DAILY    metoprolol succinate XL (TOPROL-XL) 50 MG 24 hr tablet [Pharmacy Med Name: METOPROLOL ER SUCCINATE 50MG TABS] 90 tablet 3     Sig: TAKE 1 TABLET BY MOUTH DAILY      Last office visit with prescribing clinician: 8/29/2023   Last telemedicine visit with prescribing clinician: Visit date not found   Next office visit with prescribing clinician: 8/28/2024                         Would you like a call back once the refill request has been completed: [] Yes [] No    If the office needs to give you a call back, can they leave a voicemail: [] Yes [] No    Phoebe Mathis MA  12/29/23, 12:55 EST

## 2024-01-15 RX ORDER — DEXLANSOPRAZOLE 60 MG/1
CAPSULE, DELAYED RELEASE ORAL
Qty: 90 CAPSULE | Refills: 1 | Status: SHIPPED | OUTPATIENT
Start: 2024-01-15

## 2024-03-06 DIAGNOSIS — Z12.11 ENCOUNTER FOR COLORECTAL CANCER SCREENING: Primary | ICD-10-CM

## 2024-03-06 DIAGNOSIS — Z12.12 ENCOUNTER FOR COLORECTAL CANCER SCREENING: Primary | ICD-10-CM

## 2024-03-27 RX ORDER — ISOSORBIDE MONONITRATE 60 MG/1
60 TABLET, EXTENDED RELEASE ORAL EVERY 24 HOURS
Qty: 90 TABLET | Refills: 2 | Status: SHIPPED | OUTPATIENT
Start: 2024-03-27

## 2024-03-27 RX ORDER — SITAGLIPTIN AND METFORMIN HYDROCHLORIDE 1000; 50 MG/1; MG/1
TABLET, FILM COATED, EXTENDED RELEASE ORAL
Qty: 180 TABLET | Refills: 0 | Status: SHIPPED | OUTPATIENT
Start: 2024-03-27

## 2024-03-27 NOTE — TELEPHONE ENCOUNTER
Rx Refill Note  Requested Prescriptions     Pending Prescriptions Disp Refills    isosorbide mononitrate (IMDUR) 60 MG 24 hr tablet [Pharmacy Med Name: ISOSORBIDE MONONITRATE 60MG ER TABS] 90 tablet 1     Sig: TAKE 1 TABLET BY MOUTH DAILY      Last office visit with prescribing clinician: 8/29/2023   Last telemedicine visit with prescribing clinician: Visit date not found   Next office visit with prescribing clinician: 8/28/2024                         Would you like a call back once the refill request has been completed: [] Yes [] No    If the office needs to give you a call back, can they leave a voicemail: [] Yes [] No    Katty Garvey MA  03/27/24, 15:37 EDT

## 2024-03-29 ENCOUNTER — OFFICE VISIT (OUTPATIENT)
Dept: FAMILY MEDICINE CLINIC | Facility: CLINIC | Age: 69
End: 2024-03-29
Payer: MEDICARE

## 2024-03-29 VITALS
DIASTOLIC BLOOD PRESSURE: 70 MMHG | HEART RATE: 80 BPM | WEIGHT: 152.2 LBS | TEMPERATURE: 97.3 F | SYSTOLIC BLOOD PRESSURE: 114 MMHG | HEIGHT: 64 IN | BODY MASS INDEX: 25.99 KG/M2 | OXYGEN SATURATION: 95 %

## 2024-03-29 DIAGNOSIS — M25.552 LEFT HIP PAIN: Primary | ICD-10-CM

## 2024-03-29 RX ORDER — MELOXICAM 15 MG/1
15 TABLET ORAL DAILY
Qty: 90 TABLET | Refills: 1 | Status: SHIPPED | OUTPATIENT
Start: 2024-03-29

## 2024-03-29 NOTE — PROGRESS NOTES
"Chief Complaint  Hip Pain (LT side X  1 week - pain has gradually increased )    Subjective        Tania Blunt presents to [facility]  Hip Pain   The incident occurred more than 1 week ago. There was no injury mechanism. The pain is present in the left hip. The quality of the pain is described as aching. The pain is severe. The pain has been Constant since onset. Pertinent negatives include no loss of motion, muscle weakness, numbness or tingling. She reports no foreign bodies present. She has tried acetaminophen for the symptoms. The treatment provided mild relief.     BMI is >= 25 and <30. (Overweight) The following options were offered after discussion;: exercise counseling/recommendations   Objective   Vital Signs:  /70 (BP Location: Left arm, Patient Position: Sitting, Cuff Size: Adult)   Pulse 80   Temp 97.3 °F (36.3 °C) (Infrared)   Ht 162.6 cm (64\")   Wt 69 kg (152 lb 3.2 oz)   SpO2 95%   BMI 26.13 kg/m²   Estimated body mass index is 26.13 kg/m² as calculated from the following:    Height as of this encounter: 162.6 cm (64\").    Weight as of this encounter: 69 kg (152 lb 3.2 oz).     BMI is >= 25 and <30. (Overweight) The following options were offered after discussion;: exercise counseling/recommendations       Review of Systems   Musculoskeletal:  Positive for arthralgias and back pain.   Neurological:  Negative for tingling and numbness.   All other systems reviewed and are negative.       Physical Exam  Vitals and nursing note reviewed.   Constitutional:       General: She is not in acute distress.     Appearance: She is well-developed.   HENT:      Head: Normocephalic.   Eyes:      General: Lids are normal.      Conjunctiva/sclera: Conjunctivae normal.   Neck:      Thyroid: No thyroid mass or thyromegaly.      Trachea: Trachea normal.   Cardiovascular:      Rate and Rhythm: Normal rate and regular rhythm.      Heart sounds: Normal heart sounds. "   Pulmonary:      Effort: Pulmonary effort is normal.      Breath sounds: Normal breath sounds.   Musculoskeletal:      Cervical back: Normal range of motion.      Left hip: Tenderness present. Decreased range of motion.   Lymphadenopathy:      Cervical: No cervical adenopathy.   Skin:     General: Skin is warm and dry.   Neurological:      Mental Status: She is alert and oriented to person, place, and time.      Gait: Gait abnormal.   Psychiatric:         Attention and Perception: She is attentive.         Mood and Affect: Mood normal.         Speech: Speech normal.         Behavior: Behavior normal.        Result Review :    The following data was reviewed by: Arti Aceves MD on 03/29/2024:  Common labs          8/15/2023    08:07 12/4/2023    15:30 12/18/2023    08:04   Common Labs   Glucose 117  224  137    BUN 17  18  16    Creatinine 0.98  1.27  0.99    Sodium 137  136  139    Potassium 5.5  4.8  4.8    Chloride 102  103  106    Calcium 9.6  8.8  9.2    Albumin 3.9  3.4     Total Bilirubin 0.2  0.3     Alkaline Phosphatase 84  85     AST (SGOT) 13  12     ALT (SGPT) 6  6     Total Cholesterol 162      Triglycerides 216      HDL Cholesterol 45      LDL Cholesterol  81      Hemoglobin A1C 7.50  6.80     Microalbumin, Urine 189.2                     Assessment and Plan     Diagnoses and all orders for this visit:    1. Left hip pain (Primary)  Assessment & Plan:  Apply a compressive ACE bandage. Rest and elevate the affected painful area.  Apply cold compresses intermittently as needed.  As pain recedes, begin normal activities slowly as tolerated.  Call if symptoms persist.      Orders:  -     XR Hip With or Without Pelvis 2 - 3 View Left; Future  -     Ambulatory Referral to Sports Medicine  -     meloxicam (Mobic) 15 MG tablet; Take 1 tablet by mouth Daily.  Dispense: 90 tablet; Refill: 1             Follow Up     No follow-ups on file.  Patient was given instructions and counseling regarding her  condition or for health maintenance advice. Please see specific information pulled into the AVS if appropriate.         Answers submitted by the patient for this visit:  Primary Reason for Visit (Submitted on 3/28/2024)  What is the primary reason for your visit?: Extremity Pain  Lower Extremity Injury Questionnaire (Submitted on 3/28/2024)  Chief Complaint: Extremity pain  Injury: No

## 2024-04-02 ENCOUNTER — PATIENT ROUNDING (BHMG ONLY) (OUTPATIENT)
Dept: ORTHOPEDIC SURGERY | Facility: CLINIC | Age: 69
End: 2024-04-02
Payer: MEDICARE

## 2024-04-02 ENCOUNTER — OFFICE VISIT (OUTPATIENT)
Dept: ORTHOPEDIC SURGERY | Facility: CLINIC | Age: 69
End: 2024-04-02
Payer: MEDICARE

## 2024-04-02 VITALS — OXYGEN SATURATION: 97 % | RESPIRATION RATE: 20 BRPM | HEIGHT: 64 IN | BODY MASS INDEX: 25.95 KG/M2 | WEIGHT: 152 LBS

## 2024-04-02 DIAGNOSIS — R10.12 LEFT UPPER QUADRANT ABDOMINAL PAIN: Primary | ICD-10-CM

## 2024-04-02 DIAGNOSIS — M25.552 LEFT HIP PAIN: ICD-10-CM

## 2024-04-02 NOTE — PROGRESS NOTES
A My-Chart message has been sent to the patient for PATIENT ROUNDING with [facility] [place]

## 2024-04-02 NOTE — PROGRESS NOTES
[facility] [place] [facility]        Patient Name: Tania Blunt  : 1955  Primary Care Physician: Arti Aceves MD        Chief Complaint: left hip pain       HPI:   Tania Blunt is a 68 y.o. year old who presents today for evaluation of the above.    She states the pain began ~ 5 months ago; worse over the last several weeks. She denies any injury or acute trauma. Pain location is posterior, left, upper flank area. She denies any left groin or left lateral hip pain. Denies left thigh pain. Occasionally has pain in the LLE with some mild ankle swelling. She states pain is very focal in location. Generally occurs with twisting of her trunk. She has no trouble with weight bearing, ambulation or ROM of the left hip.     She has a h/o left nephrectomy r/t congenital defect. Denies any recent weight loss. Denies N/V/D/C. She does have arthritis in her back.     Seen by her PCP recently and prescribed Mobic 15 mg. Started 2 days ago with minimal relief.                 Past Medical/Surgical, Social and Family History:  I have reviewed and/or updated pertinent history as noted in the medical record including:  Past Medical History:   Diagnosis Date    Arthritis     CAD (coronary artery disease)     Diabetes     GERD (gastroesophageal reflux disease)     Heart disease     Hypertension     Hypoxemia      Past Surgical History:   Procedure Laterality Date    APPENDECTOMY      CARDIAC CATHETERIZATION  2015    No Intervention: Disease noted RCA & Circumflex Artery    CATARACT EXTRACTION Bilateral     COLONOSCOPY  2020    Cologuard negative.    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    TUBAL ABDOMINAL LIGATION Bilateral      Social History     Occupational History    Not on file   Tobacco Use    Smoking status: Every Day     Current packs/day: 1.00     Average packs/day: 1 pack/day for 50.0 years (50.0 ttl pk-yrs)     Types: Cigarettes    Smokeless tobacco: Never    Vaping Use    Vaping status: Never Used   Substance and Sexual Activity    Alcohol use: No     Comment: drinks caffeine free sodas    Drug use: Never    Sexual activity: Not Currently     Partners: Male          Allergies: No Known Allergies    Medications:   Home Medications:  Current Outpatient Medications on File Prior to Visit   Medication Sig    amLODIPine (NORVASC) 10 MG tablet TAKE 1 TABLET BY MOUTH DAILY    aspirin 81 MG tablet Take 1 tablet by mouth 2 (Two) Times a Day.    fluconazole (Diflucan) 100 MG tablet Take 1 tablet by mouth Daily. (Patient not taking: Reported on 12/4/2023)    gabapentin (NEURONTIN) 400 MG capsule TAKE 1 CAPSULE BY MOUTH THREE TIMES DAILY FOR NERVE PAIN    glucose blood (Contour Test) test strip Use to test blood sugar twice daily E11.65    isosorbide mononitrate (IMDUR) 60 MG 24 hr tablet TAKE 1 TABLET BY MOUTH DAILY    Janumet XR  MG tablet TAKE 1 TABLET BY MOUTH TWICE DAILY    Lancets Thin misc LANCETS THIN    meloxicam (Mobic) 15 MG tablet Take 1 tablet by mouth Daily.    metoprolol succinate XL (TOPROL-XL) 50 MG 24 hr tablet TAKE 1 TABLET BY MOUTH DAILY    nystatin (MYCOSTATIN) 585987 UNIT/GM cream Apply 1 application topically to the appropriate area as directed 2 (Two) Times a Day.    Omega-3 Fatty Acids (fish oil) 1000 MG capsule capsule Take  by mouth Daily With Breakfast.    OTEZLA 30 MG tablet Take 30 mg by mouth Daily.    simvastatin (ZOCOR) 40 MG tablet TAKE 1 TABLET BY MOUTH EVERY EVENING FOR HIGH CHOLESTEROL    sucralfate (Carafate) 1 g tablet Take 1 tablet by mouth 4 (Four) Times a Day.     No current facility-administered medications on file prior to visit.         ROS:  Negative unless listed in the HPI    Physical Exam:   68 y.o. female  There is no height or weight on file to calculate BMI.,    There were no vitals filed for this visit.  General: Alert, cooperative, appears well and in no observable distress.   HEENT: Normocephalic, atraumatic on  external visual inspection. No icterus.   CV: No significant peripheral edema.    Respiratory: Normal respiratory effort.   Skin: Warm & well perfused; appropriate skin turgor.  Psych: Appropriate mood & affect.  Neuro: Gross sensation and motor intact in affected extremity/extremities.  Vascular: Peripheral pulses palpable in affected extremity/extremities.     Ortho Exam   Left hip  On visual inspection, no bruising, rash or obvious deformity  ROM is full and without pain. Strength is 5/5  Gait is normal  No pain with palpation over the anterior or lateral hip  Pain to the left, upper flank area  No spinal point tenderness on palpation   Abdomen is soft and nontender. No guarding.     Radiology:  X-Ray Report:  Left hip(s) X-Ray  Indication: Evaluation of pain  AP, Lateral views  Findings: mild to moderate arthritic changes in the hip with joint space loss anteriorly. No acute injury noted   Bony lesion: no  Soft tissues: within normal limits  Joint spaces: subnormal  Hardware appropriately positioned: not applicable  Prior studies available for comparison: no         Assessment:  Left flank pain  There is no height or weight on file to calculate BMI.  BMI consistent with Overweight: 25.0-29.9kg/m2            Plan:  Reviewed the above imaging and exam with the patient in detail today  I do not believe her symptoms are hip joint related  I have ordered a CT of her abd/pelvis - if results are normal, will refer to neuro for T/L spine evaluation  Ok to continue meloxicam if providing relief   BMI reviewed  Follow up as needed - will call with Ct results   Patient encouraged to call with any questions or concerns in the interim        BLAZE Lou

## 2024-05-14 ENCOUNTER — OFFICE VISIT (OUTPATIENT)
Dept: FAMILY MEDICINE CLINIC | Facility: CLINIC | Age: 69
End: 2024-05-14
Payer: MEDICARE

## 2024-05-14 VITALS
OXYGEN SATURATION: 96 % | WEIGHT: 147.5 LBS | DIASTOLIC BLOOD PRESSURE: 71 MMHG | SYSTOLIC BLOOD PRESSURE: 138 MMHG | BODY MASS INDEX: 25.18 KG/M2 | HEART RATE: 73 BPM | HEIGHT: 64 IN | TEMPERATURE: 98.4 F

## 2024-05-14 DIAGNOSIS — M77.52 TENDONITIS OF ANKLE, LEFT: Primary | ICD-10-CM

## 2024-05-14 PROCEDURE — 3075F SYST BP GE 130 - 139MM HG: CPT | Performed by: FAMILY MEDICINE

## 2024-05-14 PROCEDURE — G2211 COMPLEX E/M VISIT ADD ON: HCPCS | Performed by: FAMILY MEDICINE

## 2024-05-14 PROCEDURE — 3078F DIAST BP <80 MM HG: CPT | Performed by: FAMILY MEDICINE

## 2024-05-14 PROCEDURE — 99213 OFFICE O/P EST LOW 20 MIN: CPT | Performed by: FAMILY MEDICINE

## 2024-05-14 PROCEDURE — 1125F AMNT PAIN NOTED PAIN PRSNT: CPT | Performed by: FAMILY MEDICINE

## 2024-05-14 PROCEDURE — 1159F MED LIST DOCD IN RCRD: CPT | Performed by: FAMILY MEDICINE

## 2024-05-14 PROCEDURE — 1160F RVW MEDS BY RX/DR IN RCRD: CPT | Performed by: FAMILY MEDICINE

## 2024-05-14 RX ORDER — HYDROCODONE BITARTRATE AND ACETAMINOPHEN 5; 325 MG/1; MG/1
1 TABLET ORAL EVERY 6 HOURS PRN
Qty: 30 TABLET | Refills: 0 | Status: SHIPPED | OUTPATIENT
Start: 2024-05-14

## 2024-05-14 NOTE — PROGRESS NOTES
"Chief Complaint  Leg Pain (LT - on going for a month and gradually worsened )    Subjective        Tania Blunt presents to [facility]  Leg Pain   The incident occurred more than 1 week ago. There was no injury mechanism. The pain is present in the left leg. The quality of the pain is described as aching. The pain is moderate. The pain has been Worsening since onset. Pertinent negatives include no inability to bear weight, loss of motion, muscle weakness, numbness or tingling. She reports no foreign bodies present. She has tried rest for the symptoms. The treatment provided no relief.       Objective   Vital Signs:  /71 (BP Location: Right arm, Patient Position: Sitting, Cuff Size: Large Adult)   Pulse 73   Temp 98.4 °F (36.9 °C) (Infrared)   Ht 162.6 cm (64\")   Wt 66.9 kg (147 lb 8 oz)   SpO2 96%   BMI 25.32 kg/m²   Estimated body mass index is 25.32 kg/m² as calculated from the following:    Height as of this encounter: 162.6 cm (64\").    Weight as of this encounter: 66.9 kg (147 lb 8 oz).               Physical Exam  Vitals and nursing note reviewed.   Constitutional:       General: She is not in acute distress.     Appearance: She is well-developed.   HENT:      Head: Normocephalic.   Eyes:      General: Lids are normal.      Conjunctiva/sclera: Conjunctivae normal.   Neck:      Thyroid: No thyroid mass or thyromegaly.      Trachea: Trachea normal.   Cardiovascular:      Rate and Rhythm: Normal rate and regular rhythm.      Heart sounds: Normal heart sounds.   Pulmonary:      Effort: Pulmonary effort is normal.      Breath sounds: Normal breath sounds.   Musculoskeletal:      Cervical back: Normal range of motion.      Left lower leg: Tenderness present. No swelling or deformity.   Lymphadenopathy:      Cervical: No cervical adenopathy.   Skin:     General: Skin is warm and dry.   Neurological:      Mental Status: She is alert and oriented to person, place, and " time.   Psychiatric:         Attention and Perception: She is attentive.         Mood and Affect: Mood normal.         Speech: Speech normal.         Behavior: Behavior normal.        Result Review :    The following data was reviewed by: Arti Aceves MD on 05/14/2024:  Common labs          8/15/2023    08:07 12/4/2023    15:30 12/18/2023    08:04   Common Labs   Glucose 117  224  137    BUN 17  18  16    Creatinine 0.98  1.27  0.99    Sodium 137  136  139    Potassium 5.5  4.8  4.8    Chloride 102  103  106    Calcium 9.6  8.8  9.2    Albumin 3.9  3.4     Total Bilirubin 0.2  0.3     Alkaline Phosphatase 84  85     AST (SGOT) 13  12     ALT (SGPT) 6  6     Total Cholesterol 162      Triglycerides 216      HDL Cholesterol 45      LDL Cholesterol  81      Hemoglobin A1C 7.50  6.80     Microalbumin, Urine 189.2                     Assessment and Plan     Diagnoses and all orders for this visit:    1. Tendonitis of ankle, left (Primary)  Assessment & Plan:  Apply a compressive ACE bandage. Rest and elevate the affected painful area.  Apply cold compresses intermittently as needed.  As pain recedes, begin normal activities slowly as tolerated.  Call if symptoms persist.      Orders:  -     HYDROcodone-acetaminophen (Norco) 5-325 MG per tablet; Take 1 tablet by mouth Every 6 (Six) Hours As Needed for Moderate Pain.  Dispense: 30 tablet; Refill: 0             Follow Up     No follow-ups on file.  Patient was given instructions and counseling regarding her condition or for health maintenance advice. Please see specific information pulled into the AVS if appropriate.         Answers submitted by the patient for this visit:  Other (Submitted on 5/13/2024)  Please describe your symptoms.: Pain on the lower outside of my left leg.  Have you had these symptoms before?: No  How long have you been having these symptoms?: Greater than 2 weeks  Please describe any probable cause for these symptoms. : Tendon problem?  Primary  Reason for Visit (Submitted on 5/13/2024)  What is the primary reason for your visit?: Other

## 2024-06-26 RX ORDER — SITAGLIPTIN AND METFORMIN HYDROCHLORIDE 1000; 50 MG/1; MG/1
TABLET, FILM COATED, EXTENDED RELEASE ORAL
Qty: 180 TABLET | Refills: 0 | Status: SHIPPED | OUTPATIENT
Start: 2024-06-26

## 2024-08-25 RX ORDER — GABAPENTIN 400 MG/1
CAPSULE ORAL
Qty: 270 CAPSULE | Refills: 1 | Status: SHIPPED | OUTPATIENT
Start: 2024-08-25

## 2024-09-03 ENCOUNTER — OFFICE VISIT (OUTPATIENT)
Dept: FAMILY MEDICINE CLINIC | Facility: CLINIC | Age: 69
End: 2024-09-03
Payer: MEDICARE

## 2024-09-03 VITALS
SYSTOLIC BLOOD PRESSURE: 138 MMHG | OXYGEN SATURATION: 96 % | HEIGHT: 64 IN | BODY MASS INDEX: 28.89 KG/M2 | TEMPERATURE: 98.4 F | DIASTOLIC BLOOD PRESSURE: 78 MMHG | WEIGHT: 169.2 LBS | HEART RATE: 80 BPM

## 2024-09-03 DIAGNOSIS — R35.0 URINE FREQUENCY: Primary | ICD-10-CM

## 2024-09-03 DIAGNOSIS — M25.552 LEFT HIP PAIN: ICD-10-CM

## 2024-09-03 DIAGNOSIS — E11.9 TYPE 2 DIABETES MELLITUS WITHOUT COMPLICATION, WITHOUT LONG-TERM CURRENT USE OF INSULIN: ICD-10-CM

## 2024-09-03 DIAGNOSIS — R31.9 HEMATURIA, UNSPECIFIED TYPE: ICD-10-CM

## 2024-09-03 LAB
BILIRUB BLD-MCNC: ABNORMAL MG/DL
CLARITY, POC: CLEAR
COLOR UR: YELLOW
EXPIRATION DATE: ABNORMAL
GLUCOSE UR STRIP-MCNC: ABNORMAL MG/DL
KETONES UR QL: NEGATIVE
LEUKOCYTE EST, POC: NEGATIVE
Lab: ABNORMAL
NITRITE UR-MCNC: NEGATIVE MG/ML
PH UR: 5.5 [PH] (ref 5–8)
PROT UR STRIP-MCNC: ABNORMAL MG/DL
RBC # UR STRIP: ABNORMAL /UL
SP GR UR: 1.03 (ref 1–1.03)
UROBILINOGEN UR QL: ABNORMAL

## 2024-09-03 PROCEDURE — 1159F MED LIST DOCD IN RCRD: CPT | Performed by: FAMILY MEDICINE

## 2024-09-03 PROCEDURE — 1160F RVW MEDS BY RX/DR IN RCRD: CPT | Performed by: FAMILY MEDICINE

## 2024-09-03 PROCEDURE — 81003 URINALYSIS AUTO W/O SCOPE: CPT | Performed by: FAMILY MEDICINE

## 2024-09-03 PROCEDURE — G2211 COMPLEX E/M VISIT ADD ON: HCPCS | Performed by: FAMILY MEDICINE

## 2024-09-03 PROCEDURE — 3078F DIAST BP <80 MM HG: CPT | Performed by: FAMILY MEDICINE

## 2024-09-03 PROCEDURE — 87086 URINE CULTURE/COLONY COUNT: CPT | Performed by: FAMILY MEDICINE

## 2024-09-03 PROCEDURE — 3075F SYST BP GE 130 - 139MM HG: CPT | Performed by: FAMILY MEDICINE

## 2024-09-03 PROCEDURE — 99214 OFFICE O/P EST MOD 30 MIN: CPT | Performed by: FAMILY MEDICINE

## 2024-09-03 PROCEDURE — 1125F AMNT PAIN NOTED PAIN PRSNT: CPT | Performed by: FAMILY MEDICINE

## 2024-09-03 RX ORDER — SULFAMETHOXAZOLE/TRIMETHOPRIM 800-160 MG
1 TABLET ORAL 2 TIMES DAILY
Qty: 14 TABLET | Refills: 0 | Status: SHIPPED | OUTPATIENT
Start: 2024-09-03

## 2024-09-03 RX ORDER — MELOXICAM 15 MG/1
15 TABLET ORAL DAILY
Qty: 90 TABLET | Refills: 1 | Status: SHIPPED | OUTPATIENT
Start: 2024-09-03

## 2024-09-03 NOTE — PROGRESS NOTES
"Chief Complaint  Urinary Frequency (X 1 month )    Subjective        Tania Blunt presents to [facility]  Dysuria   This is a new problem. The current episode started 1 to 4 weeks ago. The problem occurs daily. The problem has been unchanged. The quality of the pain is described as aching. The pain is at a severity of 2/10. There has been no fever. She is not sexually active. There is no history of pyelonephritis. Associated symptoms include frequency and sweats. Pertinent negatives include no chills, discharge, flank pain, hematuria, hesitancy, nausea, possible pregnancy, urgency or vomiting.   Additional comments: Possible infection      Objective   Vital Signs:  /78 (BP Location: Right arm, Patient Position: Sitting, Cuff Size: Large Adult)   Pulse 80   Temp 98.4 °F (36.9 °C) (Infrared)   Ht 162.6 cm (64\")   Wt 76.7 kg (169 lb 3.2 oz)   SpO2 96%   BMI 29.04 kg/m²   Estimated body mass index is 29.04 kg/m² as calculated from the following:    Height as of this encounter: 162.6 cm (64\").    Weight as of this encounter: 76.7 kg (169 lb 3.2 oz).            Physical Exam  Vitals and nursing note reviewed.   Constitutional:       General: She is not in acute distress.     Appearance: She is well-developed.   HENT:      Head: Normocephalic.   Eyes:      General: Lids are normal.   Neck:      Thyroid: No thyroid mass or thyromegaly.      Trachea: Trachea normal.   Cardiovascular:      Rate and Rhythm: Normal rate and regular rhythm.      Heart sounds: Normal heart sounds.   Pulmonary:      Effort: Pulmonary effort is normal.      Breath sounds: Normal breath sounds.   Abdominal:      Palpations: Abdomen is soft.   Musculoskeletal:      Cervical back: Normal range of motion.   Lymphadenopathy:      Cervical: No cervical adenopathy.   Skin:     General: Skin is warm and dry.   Neurological:      Mental Status: She is alert and oriented to person, place, and time. "   Psychiatric:         Attention and Perception: She is attentive.         Mood and Affect: Mood normal.         Speech: Speech normal.         Behavior: Behavior normal.        Result Review :  The following data was reviewed by: Arti Aceves MD on 09/03/2024:  Common labs          12/4/2023    15:30 12/18/2023    08:04   Common Labs   Glucose 224  137    BUN 18  16    Creatinine 1.27  0.99    Sodium 136  139    Potassium 4.8  4.8    Chloride 103  106    Calcium 8.8  9.2    Albumin 3.4     Total Bilirubin 0.3     Alkaline Phosphatase 85     AST (SGOT) 12     ALT (SGPT) 6     Hemoglobin A1C 6.80                 Assessment and Plan   Diagnoses and all orders for this visit:    1. Urine frequency (Primary)  -     POCT urinalysis dipstick, automated  -     Urine Culture - Urine, Urine, Clean Catch  -     sulfamethoxazole-trimethoprim (Bactrim DS) 800-160 MG per tablet; Take 1 tablet by mouth 2 (Two) Times a Day.  Dispense: 14 tablet; Refill: 0    2. Hematuria, unspecified type  -     Urine Culture - Urine, Urine, Clean Catch  -     sulfamethoxazole-trimethoprim (Bactrim DS) 800-160 MG per tablet; Take 1 tablet by mouth 2 (Two) Times a Day.  Dispense: 14 tablet; Refill: 0    3. Type 2 diabetes mellitus without complication, without long-term current use of insulin  Assessment & Plan:  She will return when fasting to have labs.     Orders:  -     Hemoglobin A1c  -     Comprehensive Metabolic Panel  -     Microalbumin / Creatinine Urine Ratio - Urine, Clean Catch    4. Left hip pain  -     meloxicam (Mobic) 15 MG tablet; Take 1 tablet by mouth Daily.  Dispense: 90 tablet; Refill: 1             Follow Up   No follow-ups on file.  Patient was given instructions and counseling regarding her condition or for health maintenance advice. Please see specific information pulled into the AVS if appropriate.             Answers submitted by the patient for this visit:  Primary Reason for Visit (Submitted on 9/2/2024)  What is  the primary reason for your visit?: Painful Urination

## 2024-09-04 LAB — BACTERIA SPEC AEROBE CULT: NORMAL

## 2024-09-06 ENCOUNTER — LAB (OUTPATIENT)
Dept: FAMILY MEDICINE CLINIC | Facility: CLINIC | Age: 69
End: 2024-09-06
Payer: MEDICARE

## 2024-09-06 LAB
ALBUMIN SERPL-MCNC: 3.4 G/DL (ref 3.5–5.2)
ALBUMIN UR-MCNC: 195.9 MG/DL
ALBUMIN/GLOB SERPL: 0.9 G/DL
ALP SERPL-CCNC: 73 U/L (ref 39–117)
ALT SERPL W P-5'-P-CCNC: 12 U/L (ref 1–33)
ANION GAP SERPL CALCULATED.3IONS-SCNC: 11.6 MMOL/L (ref 5–15)
AST SERPL-CCNC: 16 U/L (ref 1–32)
BILIRUB SERPL-MCNC: 0.2 MG/DL (ref 0–1.2)
BUN SERPL-MCNC: 28 MG/DL (ref 8–23)
BUN/CREAT SERPL: 16.6 (ref 7–25)
CALCIUM SPEC-SCNC: 9.4 MG/DL (ref 8.6–10.5)
CHLORIDE SERPL-SCNC: 103 MMOL/L (ref 98–107)
CO2 SERPL-SCNC: 22.4 MMOL/L (ref 22–29)
CREAT SERPL-MCNC: 1.69 MG/DL (ref 0.57–1)
CREAT UR-MCNC: 54 MG/DL
EGFRCR SERPLBLD CKD-EPI 2021: 32.6 ML/MIN/1.73
GLOBULIN UR ELPH-MCNC: 3.6 GM/DL
GLUCOSE SERPL-MCNC: 152 MG/DL (ref 65–99)
HBA1C MFR BLD: 7.1 % (ref 4.8–5.6)
MICROALBUMIN/CREAT UR: 3627.8 MG/G (ref 0–29)
POTASSIUM SERPL-SCNC: 5.9 MMOL/L (ref 3.5–5.2)
PROT SERPL-MCNC: 7 G/DL (ref 6–8.5)
SODIUM SERPL-SCNC: 137 MMOL/L (ref 136–145)

## 2024-09-06 PROCEDURE — 80053 COMPREHEN METABOLIC PANEL: CPT | Performed by: FAMILY MEDICINE

## 2024-09-06 PROCEDURE — 36415 COLL VENOUS BLD VENIPUNCTURE: CPT | Performed by: FAMILY MEDICINE

## 2024-09-06 PROCEDURE — 82043 UR ALBUMIN QUANTITATIVE: CPT | Performed by: FAMILY MEDICINE

## 2024-09-06 PROCEDURE — 82570 ASSAY OF URINE CREATININE: CPT | Performed by: FAMILY MEDICINE

## 2024-09-06 PROCEDURE — 83036 HEMOGLOBIN GLYCOSYLATED A1C: CPT | Performed by: FAMILY MEDICINE

## 2024-09-13 DIAGNOSIS — R31.9 HEMATURIA, UNSPECIFIED TYPE: ICD-10-CM

## 2024-09-13 DIAGNOSIS — N18.9 CHRONIC KIDNEY DISEASE, UNSPECIFIED CKD STAGE: Primary | ICD-10-CM

## 2024-09-19 ENCOUNTER — OFFICE VISIT (OUTPATIENT)
Dept: CARDIOLOGY | Facility: CLINIC | Age: 69
End: 2024-09-19
Payer: MEDICARE

## 2024-09-19 VITALS
DIASTOLIC BLOOD PRESSURE: 80 MMHG | HEART RATE: 85 BPM | WEIGHT: 161 LBS | BODY MASS INDEX: 27.49 KG/M2 | OXYGEN SATURATION: 96 % | SYSTOLIC BLOOD PRESSURE: 158 MMHG | HEIGHT: 64 IN

## 2024-09-19 DIAGNOSIS — E11.9 TYPE 2 DIABETES MELLITUS WITHOUT COMPLICATION, WITHOUT LONG-TERM CURRENT USE OF INSULIN: ICD-10-CM

## 2024-09-19 DIAGNOSIS — I10 ESSENTIAL HYPERTENSION: ICD-10-CM

## 2024-09-19 DIAGNOSIS — I25.10 CORONARY ARTERY DISEASE INVOLVING NATIVE CORONARY ARTERY OF NATIVE HEART WITHOUT ANGINA PECTORIS: Primary | ICD-10-CM

## 2024-09-19 DIAGNOSIS — E78.5 DYSLIPIDEMIA: ICD-10-CM

## 2024-09-23 RX ORDER — SITAGLIPTIN AND METFORMIN HYDROCHLORIDE 1000; 50 MG/1; MG/1
TABLET, FILM COATED, EXTENDED RELEASE ORAL
Qty: 180 TABLET | Refills: 1 | Status: SHIPPED | OUTPATIENT
Start: 2024-09-23

## 2024-09-27 ENCOUNTER — TELEPHONE (OUTPATIENT)
Dept: FAMILY MEDICINE CLINIC | Facility: CLINIC | Age: 69
End: 2024-09-27

## 2024-09-27 NOTE — TELEPHONE ENCOUNTER
Caller: Angela Blunt    Relationship: Self    Best call back number: [phone]     What is the best time to reach you: ANY    Who are you requesting to speak with (clinical staff, provider,  specific staff member): CLINICAL    Do you know the name of the person who called: ANGELA    What was the call regarding:   PATIENT STATES SHE HAS NOT BEEN CALLED BACK IN A WEEK AND SHE IS HAVING KIDNEY PAIN. PLEASE CALL PATIENT ABOUT NEPHROLOGY REFERRAL

## 2024-09-27 NOTE — TELEPHONE ENCOUNTER
The patient was verbally informed that the referral was placed 09-13-24 and the status of this referral is ready to schedule    She was given the number to  [phone]

## 2024-10-04 ENCOUNTER — TRANSCRIBE ORDERS (OUTPATIENT)
Dept: ADMINISTRATIVE | Facility: HOSPITAL | Age: 69
End: 2024-10-04
Payer: MEDICARE

## 2024-10-04 DIAGNOSIS — R31.0 GROSS HEMATURIA: Primary | ICD-10-CM

## 2024-10-31 ENCOUNTER — HOSPITAL ENCOUNTER (OUTPATIENT)
Dept: CT IMAGING | Facility: HOSPITAL | Age: 69
Discharge: HOME | End: 2024-10-31
Admitting: NURSE PRACTITIONER
Payer: MEDICARE

## 2024-10-31 DIAGNOSIS — R31.0 GROSS HEMATURIA: ICD-10-CM

## 2024-10-31 PROCEDURE — 74176 CT ABD & PELVIS W/O CONTRAST: CPT

## 2024-11-24 RX ORDER — SIMVASTATIN 40 MG
TABLET ORAL
Qty: 90 TABLET | Refills: 3 | Status: SHIPPED | OUTPATIENT
Start: 2024-11-24

## 2024-11-30 DIAGNOSIS — R19.5 POSITIVE COLORECTAL CANCER SCREENING USING COLOGUARD TEST: Primary | ICD-10-CM

## 2024-12-02 ENCOUNTER — TELEPHONE (OUTPATIENT)
Dept: FAMILY MEDICINE CLINIC | Facility: CLINIC | Age: 69
End: 2024-12-02

## 2024-12-02 ENCOUNTER — PREP FOR SURGERY (OUTPATIENT)
Dept: OTHER | Facility: HOSPITAL | Age: 69
End: 2024-12-02
Payer: MEDICARE

## 2024-12-02 DIAGNOSIS — Z12.11 ENCOUNTER FOR SCREENING COLONOSCOPY: Primary | ICD-10-CM

## 2024-12-02 RX ORDER — AMLODIPINE BESYLATE 10 MG/1
10 TABLET ORAL DAILY
Qty: 90 TABLET | Refills: 3 | Status: SHIPPED | OUTPATIENT
Start: 2024-12-02

## 2024-12-02 RX ORDER — METOPROLOL SUCCINATE 50 MG/1
50 TABLET, EXTENDED RELEASE ORAL DAILY
Qty: 90 TABLET | Refills: 3 | Status: SHIPPED | OUTPATIENT
Start: 2024-12-02

## 2024-12-02 NOTE — TELEPHONE ENCOUNTER
Caller: Tania Blunt    Relationship: Self    Best call back number: [phone]     What was the call regarding: PATIENT IS CALLING TO GET AN UPDATE ON HER COLONOSCOPY ORDER

## 2024-12-02 NOTE — TELEPHONE ENCOUNTER
Rx Refill Note  Requested Prescriptions     Pending Prescriptions Disp Refills    amLODIPine (NORVASC) 10 MG tablet [Pharmacy Med Name: AMLODIPINE BESYLATE 10MG TABLETS] 90 tablet 3     Sig: TAKE 1 TABLET BY MOUTH DAILY    metoprolol succinate XL (TOPROL-XL) 50 MG 24 hr tablet [Pharmacy Med Name: METOPROLOL ER SUCCINATE 50MG TABS] 90 tablet 3     Sig: TAKE 1 TABLET BY MOUTH DAILY      Last office visit with prescribing clinician: 9/19/2024   Last telemedicine visit with prescribing clinician: Visit date not found   Next office visit with prescribing clinician: 9/23/2025                         Would you like a call back once the refill request has been completed: [] Yes [] No    If the office needs to give you a call back, can they leave a voicemail: [] Yes [] No    Phoebe Mathis MA  12/02/24, 11:07 EST

## 2024-12-03 NOTE — TELEPHONE ENCOUNTER
LEFT A VM STATING HER REFERRAL WAS SENT TO  ON [facility] THEIR # [phone] SHE CAN CALL TO SCHEDULE HER APPOINTMENT      HUB OK TO RELAY

## 2024-12-19 RX ORDER — ISOSORBIDE MONONITRATE 60 MG/1
60 TABLET, EXTENDED RELEASE ORAL EVERY 24 HOURS
Qty: 90 TABLET | Refills: 2 | Status: SHIPPED | OUTPATIENT
Start: 2024-12-19

## 2024-12-19 NOTE — TELEPHONE ENCOUNTER
Rx Refill Note  Requested Prescriptions     Pending Prescriptions Disp Refills    isosorbide mononitrate (IMDUR) 60 MG 24 hr tablet [Pharmacy Med Name: ISOSORBIDE MONONITRATE 60MG ER TABS] 90 tablet 2     Sig: TAKE 1 TABLET BY MOUTH DAILY      Last office visit with prescribing clinician: 9/19/2024   Last telemedicine visit with prescribing clinician: Visit date not found   Next office visit with prescribing clinician: 9/23/2025                         Would you like a call back once the refill request has been completed: [] Yes [] No    If the office needs to give you a call back, can they leave a voicemail: [] Yes [] No    Katty Garvey MA  12/19/24, 09:11 EST

## 2025-01-03 ENCOUNTER — TRANSCRIBE ORDERS (OUTPATIENT)
Dept: LAB | Facility: HOSPITAL | Age: 70
End: 2025-01-03
Payer: MEDICARE

## 2025-01-03 ENCOUNTER — LAB (OUTPATIENT)
Dept: LAB | Facility: HOSPITAL | Age: 70
End: 2025-01-03
Payer: MEDICARE

## 2025-01-03 DIAGNOSIS — E11.22 TYPE 2 DIABETES MELLITUS WITH CHRONIC KIDNEY DISEASE, WITHOUT LONG-TERM CURRENT USE OF INSULIN, UNSPECIFIED CKD STAGE: ICD-10-CM

## 2025-01-03 DIAGNOSIS — N17.9 ACUTE RENAL FAILURE, UNSPECIFIED ACUTE RENAL FAILURE TYPE: ICD-10-CM

## 2025-01-03 DIAGNOSIS — N17.9 ACUTE RENAL FAILURE, UNSPECIFIED ACUTE RENAL FAILURE TYPE: Primary | ICD-10-CM

## 2025-01-03 LAB
25(OH)D3 SERPL-MCNC: 16 NG/ML (ref 30–100)
ANION GAP SERPL CALCULATED.3IONS-SCNC: 9 MMOL/L (ref 5–15)
BACTERIA UR QL AUTO: ABNORMAL /HPF
BASOPHILS # BLD AUTO: 0.08 10*3/MM3 (ref 0–0.2)
BASOPHILS NFR BLD AUTO: 0.7 % (ref 0–1.5)
BILIRUB UR QL STRIP: NEGATIVE
BUN SERPL-MCNC: 28 MG/DL (ref 8–23)
BUN/CREAT SERPL: 19.9 (ref 7–25)
CALCIUM SPEC-SCNC: 9.3 MG/DL (ref 8.6–10.5)
CHLORIDE SERPL-SCNC: 105 MMOL/L (ref 98–107)
CK SERPL-CCNC: 54 U/L (ref 20–180)
CLARITY UR: CLEAR
CO2 SERPL-SCNC: 23 MMOL/L (ref 22–29)
COLOR UR: YELLOW
CREAT SERPL-MCNC: 1.41 MG/DL (ref 0.57–1)
CREAT UR-MCNC: 57.2 MG/DL
DEPRECATED RDW RBC AUTO: 41.9 FL (ref 37–54)
EGFRCR SERPLBLD CKD-EPI 2021: 40.5 ML/MIN/1.73
EOSINOPHIL # BLD AUTO: 0.41 10*3/MM3 (ref 0–0.4)
EOSINOPHIL NFR BLD AUTO: 3.4 % (ref 0.3–6.2)
ERYTHROCYTE [DISTWIDTH] IN BLOOD BY AUTOMATED COUNT: 12.8 % (ref 12.3–15.4)
GLUCOSE SERPL-MCNC: 177 MG/DL (ref 65–99)
GLUCOSE UR STRIP-MCNC: ABNORMAL MG/DL
HBA1C MFR BLD: 7.75 % (ref 4.8–5.6)
HCT VFR BLD AUTO: 40.4 % (ref 34–46.6)
HGB BLD-MCNC: 13 G/DL (ref 12–15.9)
HGB UR QL STRIP.AUTO: ABNORMAL
HYALINE CASTS UR QL AUTO: ABNORMAL /LPF
IMM GRANULOCYTES # BLD AUTO: 0.03 10*3/MM3 (ref 0–0.05)
IMM GRANULOCYTES NFR BLD AUTO: 0.2 % (ref 0–0.5)
KETONES UR QL STRIP: NEGATIVE
LEUKOCYTE ESTERASE UR QL STRIP.AUTO: NEGATIVE
LYMPHOCYTES # BLD AUTO: 3.88 10*3/MM3 (ref 0.7–3.1)
LYMPHOCYTES NFR BLD AUTO: 32.3 % (ref 19.6–45.3)
MAGNESIUM SERPL-MCNC: 2.1 MG/DL (ref 1.6–2.4)
MCH RBC QN AUTO: 28.8 PG (ref 26.6–33)
MCHC RBC AUTO-ENTMCNC: 32.2 G/DL (ref 31.5–35.7)
MCV RBC AUTO: 89.4 FL (ref 79–97)
MONOCYTES # BLD AUTO: 1.02 10*3/MM3 (ref 0.1–0.9)
MONOCYTES NFR BLD AUTO: 8.5 % (ref 5–12)
NEUTROPHILS NFR BLD AUTO: 54.9 % (ref 42.7–76)
NEUTROPHILS NFR BLD AUTO: 6.6 10*3/MM3 (ref 1.7–7)
NITRITE UR QL STRIP: NEGATIVE
NRBC BLD AUTO-RTO: 0 /100 WBC (ref 0–0.2)
PH UR STRIP.AUTO: 6 [PH] (ref 5–8)
PHOSPHATE SERPL-MCNC: 4 MG/DL (ref 2.5–4.5)
PLATELET # BLD AUTO: 416 10*3/MM3 (ref 140–450)
PMV BLD AUTO: 9 FL (ref 6–12)
POTASSIUM SERPL-SCNC: 4.6 MMOL/L (ref 3.5–5.2)
PROT ?TM UR-MCNC: 628 MG/DL
PROT UR QL STRIP: ABNORMAL
PROT/CREAT UR: ABNORMAL MG/G CREA (ref 0–200)
PTH-INTACT SERPL-MCNC: 23.9 PG/ML (ref 15–65)
RBC # BLD AUTO: 4.52 10*6/MM3 (ref 3.77–5.28)
RBC # UR STRIP: ABNORMAL /HPF
REF LAB TEST METHOD: ABNORMAL
SODIUM SERPL-SCNC: 137 MMOL/L (ref 136–145)
SP GR UR STRIP: 1.02 (ref 1–1.03)
SQUAMOUS #/AREA URNS HPF: ABNORMAL /HPF
TSH SERPL DL<=0.05 MIU/L-ACNC: 2.58 UIU/ML (ref 0.27–4.2)
URATE SERPL-MCNC: 7.7 MG/DL (ref 2.4–5.7)
UROBILINOGEN UR QL STRIP: ABNORMAL
WBC # UR STRIP: ABNORMAL /HPF
WBC NRBC COR # BLD AUTO: 12.02 10*3/MM3 (ref 3.4–10.8)

## 2025-01-03 PROCEDURE — 84550 ASSAY OF BLOOD/URIC ACID: CPT

## 2025-01-03 PROCEDURE — 84156 ASSAY OF PROTEIN URINE: CPT

## 2025-01-03 PROCEDURE — 80048 BASIC METABOLIC PNL TOTAL CA: CPT

## 2025-01-03 PROCEDURE — 84443 ASSAY THYROID STIM HORMONE: CPT

## 2025-01-03 PROCEDURE — 83735 ASSAY OF MAGNESIUM: CPT

## 2025-01-03 PROCEDURE — 82550 ASSAY OF CK (CPK): CPT

## 2025-01-03 PROCEDURE — 85025 COMPLETE CBC W/AUTO DIFF WBC: CPT

## 2025-01-03 PROCEDURE — 82784 ASSAY IGA/IGD/IGG/IGM EACH: CPT

## 2025-01-03 PROCEDURE — 36415 COLL VENOUS BLD VENIPUNCTURE: CPT

## 2025-01-03 PROCEDURE — 82306 VITAMIN D 25 HYDROXY: CPT

## 2025-01-03 PROCEDURE — 81001 URINALYSIS AUTO W/SCOPE: CPT

## 2025-01-03 PROCEDURE — 82570 ASSAY OF URINE CREATININE: CPT

## 2025-01-03 PROCEDURE — 86038 ANTINUCLEAR ANTIBODIES: CPT

## 2025-01-03 PROCEDURE — 86334 IMMUNOFIX E-PHORESIS SERUM: CPT

## 2025-01-03 PROCEDURE — 83970 ASSAY OF PARATHORMONE: CPT

## 2025-01-03 PROCEDURE — 83036 HEMOGLOBIN GLYCOSYLATED A1C: CPT

## 2025-01-03 PROCEDURE — 84100 ASSAY OF PHOSPHORUS: CPT

## 2025-01-06 LAB — ANA SER QL: NEGATIVE

## 2025-01-07 LAB
IGA SERPL-MCNC: 632 MG/DL (ref 87–352)
IGG SERPL-MCNC: 1413 MG/DL (ref 586–1602)
IGM SERPL-MCNC: 53 MG/DL (ref 26–217)
PROT PATTERN SERPL IFE-IMP: ABNORMAL

## 2025-01-23 ENCOUNTER — LAB (OUTPATIENT)
Dept: LAB | Facility: HOSPITAL | Age: 70
End: 2025-01-23
Payer: MEDICARE

## 2025-01-23 ENCOUNTER — TRANSCRIBE ORDERS (OUTPATIENT)
Dept: ADMINISTRATIVE | Facility: HOSPITAL | Age: 70
End: 2025-01-23
Payer: MEDICARE

## 2025-01-23 DIAGNOSIS — N18.32 CHRONIC KIDNEY DISEASE (CKD) STAGE G3B/A1, MODERATELY DECREASED GLOMERULAR FILTRATION RATE (GFR) BETWEEN 30-44 ML/MIN/1.73 SQUARE METER AND ALBUMINURIA CREATININE RATIO LESS THAN 30 MG/G (CMS/H*: ICD-10-CM

## 2025-01-23 DIAGNOSIS — N18.32 CHRONIC KIDNEY DISEASE (CKD) STAGE G3B/A1, MODERATELY DECREASED GLOMERULAR FILTRATION RATE (GFR) BETWEEN 30-44 ML/MIN/1.73 SQUARE METER AND ALBUMINURIA CREATININE RATIO LESS THAN 30 MG/G (CMS/H*: Primary | ICD-10-CM

## 2025-01-23 LAB
ANION GAP SERPL CALCULATED.3IONS-SCNC: 7 MMOL/L (ref 5–15)
BUN SERPL-MCNC: 24 MG/DL (ref 8–23)
BUN/CREAT SERPL: 18 (ref 7–25)
CALCIUM SPEC-SCNC: 9.3 MG/DL (ref 8.6–10.5)
CHLORIDE SERPL-SCNC: 102 MMOL/L (ref 98–107)
CO2 SERPL-SCNC: 26 MMOL/L (ref 22–29)
CREAT SERPL-MCNC: 1.33 MG/DL (ref 0.57–1)
EGFRCR SERPLBLD CKD-EPI 2021: 43.4 ML/MIN/1.73
GLUCOSE SERPL-MCNC: 174 MG/DL (ref 65–99)
POTASSIUM SERPL-SCNC: 5.3 MMOL/L (ref 3.5–5.2)
SODIUM SERPL-SCNC: 135 MMOL/L (ref 136–145)

## 2025-01-23 PROCEDURE — 36415 COLL VENOUS BLD VENIPUNCTURE: CPT

## 2025-01-23 PROCEDURE — 80048 BASIC METABOLIC PNL TOTAL CA: CPT

## 2025-01-31 RX ORDER — POLYETHYLENE GLYCOL 3350, SODIUM SULFATE ANHYDROUS, SODIUM BICARBONATE, SODIUM CHLORIDE, POTASSIUM CHLORIDE 236; 22.74; 6.74; 5.86; 2.97 G/4L; G/4L; G/4L; G/4L; G/4L
4 POWDER, FOR SOLUTION ORAL ONCE
Qty: 4000 ML | Refills: 0 | Status: SHIPPED | OUTPATIENT
Start: 2025-01-31 | End: 2025-01-31

## 2025-02-12 ENCOUNTER — HOSPITAL ENCOUNTER (OUTPATIENT)
Facility: HOSPITAL | Age: 70
Setting detail: HOSPITAL OUTPATIENT SURGERY
Discharge: HOME | End: 2025-02-12
Attending: STUDENT IN AN ORGANIZED HEALTH CARE EDUCATION/TRAINING PROGRAM | Admitting: STUDENT IN AN ORGANIZED HEALTH CARE EDUCATION/TRAINING PROGRAM
Payer: MEDICARE

## 2025-02-12 ENCOUNTER — ANESTHESIA EVENT (OUTPATIENT)
Dept: GASTROENTEROLOGY | Facility: HOSPITAL | Age: 70
End: 2025-02-12
Payer: MEDICARE

## 2025-02-12 ENCOUNTER — ANESTHESIA (OUTPATIENT)
Dept: GASTROENTEROLOGY | Facility: HOSPITAL | Age: 70
End: 2025-02-12
Payer: MEDICARE

## 2025-02-12 VITALS
OXYGEN SATURATION: 98 % | WEIGHT: 158.8 LBS | HEART RATE: 82 BPM | SYSTOLIC BLOOD PRESSURE: 94 MMHG | BODY MASS INDEX: 27.11 KG/M2 | TEMPERATURE: 98.1 F | DIASTOLIC BLOOD PRESSURE: 63 MMHG | HEIGHT: 64 IN | RESPIRATION RATE: 20 BRPM

## 2025-02-12 DIAGNOSIS — Z12.11 ENCOUNTER FOR SCREENING COLONOSCOPY: ICD-10-CM

## 2025-02-12 LAB — GLUCOSE BLDC GLUCOMTR-MCNC: 225 MG/DL (ref 70–105)

## 2025-02-12 PROCEDURE — 25010000002 PROPOFOL 1000 MG/100ML EMULSION: Performed by: NURSE ANESTHETIST, CERTIFIED REGISTERED

## 2025-02-12 PROCEDURE — 25010000002 GLYCOPYRROLATE 0.2 MG/ML SOLUTION: Performed by: NURSE ANESTHETIST, CERTIFIED REGISTERED

## 2025-02-12 PROCEDURE — 25010000002 PROPOFOL 10 MG/ML EMULSION: Performed by: NURSE ANESTHETIST, CERTIFIED REGISTERED

## 2025-02-12 PROCEDURE — 45385 COLONOSCOPY W/LESION REMOVAL: CPT | Performed by: STUDENT IN AN ORGANIZED HEALTH CARE EDUCATION/TRAINING PROGRAM

## 2025-02-12 PROCEDURE — 25010000002 LIDOCAINE PF 1% 1 % SOLUTION: Performed by: NURSE ANESTHETIST, CERTIFIED REGISTERED

## 2025-02-12 PROCEDURE — 82948 REAGENT STRIP/BLOOD GLUCOSE: CPT

## 2025-02-12 PROCEDURE — 25810000003 SODIUM CHLORIDE 0.9 % SOLUTION: Performed by: NURSE ANESTHETIST, CERTIFIED REGISTERED

## 2025-02-12 PROCEDURE — 88305 TISSUE EXAM BY PATHOLOGIST: CPT | Performed by: STUDENT IN AN ORGANIZED HEALTH CARE EDUCATION/TRAINING PROGRAM

## 2025-02-12 RX ORDER — PROPOFOL 10 MG/ML
INJECTION, EMULSION INTRAVENOUS AS NEEDED
Status: DISCONTINUED | OUTPATIENT
Start: 2025-02-12 | End: 2025-02-12 | Stop reason: SURG

## 2025-02-12 RX ORDER — GLYCOPYRROLATE 0.2 MG/ML
INJECTION INTRAMUSCULAR; INTRAVENOUS AS NEEDED
Status: DISCONTINUED | OUTPATIENT
Start: 2025-02-12 | End: 2025-02-12 | Stop reason: SURG

## 2025-02-12 RX ORDER — LIDOCAINE HYDROCHLORIDE 10 MG/ML
INJECTION, SOLUTION EPIDURAL; INFILTRATION; INTRACAUDAL; PERINEURAL AS NEEDED
Status: DISCONTINUED | OUTPATIENT
Start: 2025-02-12 | End: 2025-02-12 | Stop reason: SURG

## 2025-02-12 RX ORDER — SODIUM CHLORIDE 9 MG/ML
INJECTION, SOLUTION INTRAVENOUS CONTINUOUS PRN
Status: DISCONTINUED | OUTPATIENT
Start: 2025-02-12 | End: 2025-02-12 | Stop reason: SURG

## 2025-02-12 RX ADMIN — GLYCOPYRROLATE 0.1 MG: 0.2 INJECTION, SOLUTION INTRAMUSCULAR; INTRAVENOUS at 09:41

## 2025-02-12 RX ADMIN — PROPOFOL INJECTABLE EMULSION 100 MG: 10 INJECTION, EMULSION INTRAVENOUS at 09:41

## 2025-02-12 RX ADMIN — SODIUM CHLORIDE: 9 INJECTION, SOLUTION INTRAVENOUS at 09:36

## 2025-02-12 RX ADMIN — PROPOFOL 150 MCG/KG/MIN: 10 INJECTION, EMULSION INTRAVENOUS at 09:41

## 2025-02-12 RX ADMIN — LIDOCAINE HYDROCHLORIDE 50 MG: 10 INJECTION, SOLUTION EPIDURAL; INFILTRATION; INTRACAUDAL; PERINEURAL at 09:41

## 2025-02-12 NOTE — H&P
Colorectal Surgery Preop Note    ID:  Tania Blunt;     Chief Complaint  Screening colonoscopy     History of Present Illness  Tania Blunt is a 69 y.o. female who is here for Screening colonoscopy      Past Medical History  Past Medical History:   Diagnosis Date    Abnormal ECG Unknown    Arthritis     CAD (coronary artery disease)     Diabetes     GERD (gastroesophageal reflux disease)     Heart disease     Hyperlipidemia     Hypertension     Hypoxemia      For further details please see prior notes and Health History Questionnaire scanned in Epic.  Past Surgical History  Past Surgical History:   Procedure Laterality Date    APPENDECTOMY      CARDIAC CATHETERIZATION  07/29/2015    No Intervention: Disease noted RCA & Circumflex Artery    CATARACT EXTRACTION Bilateral     COLONOSCOPY  09/21/2020    Cologuard negative.    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    TUBAL ABDOMINAL LIGATION Bilateral      For further details please see prior notes and Health History Questionnaire scanned in Epic.  Family History  Family History   Problem Relation Age of Onset    Hypertension Mother     Colon cancer Mother     Lung cancer Father     COPD Sister     Heart disease Sister     Liver disease Brother         liver transplant    Heart disease Son     Heart disease Maternal Grandmother      For further details please see prior notes and Health History Questionnaire scanned in Epic.  Social History  Social History     Tobacco Use    Smoking status: Former     Current packs/day: 1.00     Average packs/day: 1 pack/day for 50.0 years (50.0 ttl pk-yrs)     Types: Cigarettes     Passive exposure: Current    Smokeless tobacco: Never   Vaping Use    Vaping status: Every Day    Passive vaping exposure: Yes   Substance Use Topics    Alcohol use: No     Comment: drinks caffeine free sodas    Drug use: Never     For further details please see prior notes and Health History  Questionnaire scanned in Epic.  Medication List  No current facility-administered medications for this encounter.  For further details please see prior notes and Health History Questionnaire scanned in Epic.  Allergies  No Known Allergies  Review of Systems  Pertinent positives noted in HPI.    Physical Exam  General:  No acute distress  Head: Normocephalic, atraumatic  CV: Regular rate, no edema, extremities warm and well perfused  Pulm: Symmetric chest rise, non-labored breathing  Neuro: Alert and oriented     Abdomen: Please see clinic note  Anorectal: Please see clinic note    Assessment  -Screening colonoscopy       Plan / Recommendations    1. Proceed to endo for screening colonoscopy       Len Jiang MD  Colon and Rectal Surgery   [facility]

## 2025-02-12 NOTE — OP NOTE
[facility] [place] [facility]  Colonoscopy Examination     Name: Tania Blunt  : 1955  Date of Colonoscopy: 2025  Procedure: Increased Risk Screening Colonoscopy  Surgeon: Len Jiang MD   Anesthesia: Sedation   IV Fluids: refer to anesthesia record    Procedure Details:    Prior to the procedure, history and physical exam was performed. Patient's medication and allergies were reviewed. The risk and benefits of the procedure and sedation options and risks were discussed with the patient. All questions were answered and informed consent was obtained.    The patient was brought to the endoscopy suite and placed in the left lateral decubitus position. Conscious sedation was administered by the anesthesia team. Vital signs including blood pressure, heart rate, and oxygen saturation were monitored continuously throughout the procedure.    The colonoscope was introduced through the rectum and advanced through the entire colon under direct visualization. The scope was maneuvered carefully, and the mucosa of the rectum, sigmoid colon, descending colon, transverse colon, ascending colon, and cecum were thoroughly inspected. Adequate insufflation was maintained throughout the procedure for optimal visualization.    Findings:    Rectum: Internal rectal prolapse.   Sigmoid colon: Normal appearance with no lesions,polyps, or abnormalities.                                                     Descending colon: 10 mm polyp removed with cold snare.   Transverse colon: Two 10 mm polyp removed with cold snare.   Ascending colon: Normal appearance with no lesions,polyps, or abnormalities.  Cecum: Normal appearance with no lesions,polyps, or abnormalities.    Procedure Images:      Attached in a separate file.       Interventions:    Transverse colon (10 mm x 2) cold snare   Descending colon (10 mm) cold snare     Specimens:  The removed polyp was retrieved and sent for histopathological examination to the pathology  department for further analysis.    Recommendation:     Repeat colonoscopy in 3-5 years     Conclusion:  The screening colonoscopy was completed successfully, and the entire colon was visualized without any complications. The patient tolerated the procedure well and was transferred to the recovery area in stable condition. Post-procedure instructions and follow-up were discussed with the patient and will be provided in written form.    Len Jiang MD  Colon and Rectal Surgery   [facility] [place]-[address], [address]  T: [phone]

## 2025-02-12 NOTE — ANESTHESIA POSTPROCEDURE EVALUATION
Patient: Tania Blunt    Procedure Summary       Date: 02/12/25 Room / Location: [facility] ENDOSCOPY 4 / [facility] ENDOSCOPY    Anesthesia Start: 0936 Anesthesia Stop: 1003    Procedure: COLONOSCOPY WITH COLD SNARE POLYPECTOMY X3 (Rectum) Diagnosis:       Encounter for screening colonoscopy      (Encounter for screening colonoscopy [Z12.11])    Surgeons: Len Jiang MD Provider: George Corral MD    Anesthesia Type: general ASA Status: 3            Anesthesia Type: general    Vitals  Vitals Value Taken Time   /57 02/12/25 1026   Temp     Pulse 76 02/12/25 1030   Resp 20 02/12/25 1025   SpO2 98 % 02/12/25 1030   Vitals shown include unfiled device data.        Post Anesthesia Care and Evaluation    Patient location during evaluation: PACU  Patient participation: complete - patient participated  Level of consciousness: awake  Pain scale: See nurse's notes for pain score.  Pain management: adequate    Airway patency: patent  Anesthetic complications: No anesthetic complications  PONV Status: none  Cardiovascular status: acceptable  Respiratory status: acceptable and spontaneous ventilation  Hydration status: acceptable    Comments: Patient seen and examined postoperatively; vital signs stable; SpO2 greater than or equal to 90%; cardiopulmonary status stable; nausea/vomiting adequately controlled; pain adequately controlled; no apparent anesthesia complications; patient discharged from anesthesia care when discharge criteria were met

## 2025-02-12 NOTE — ANESTHESIA PREPROCEDURE EVALUATION
Anesthesia Evaluation     Patient summary reviewed and Nursing notes reviewed   no history of anesthetic complications:   NPO Solid Status: > 8 hours  NPO Liquid Status: > 4 hours           Airway   Mallampati: II  TM distance: >3 FB  Neck ROM: full  No difficulty expected  Dental    (+) poor dentition and upper dentures        Pulmonary - normal exam   (+) ,sleep apnea  Cardiovascular - normal exam    (+) hypertension, CAD, hyperlipidemia      Neuro/Psych- negative ROS  GI/Hepatic/Renal/Endo    (+) GERD, diabetes mellitus    Musculoskeletal     (+) neck pain  Abdominal  - normal exam   Substance History - negative use     OB/GYN negative ob/gyn ROS         Other   arthritis,                       Anesthesia Plan    ASA 3     general     (Discussed loose teeth and increased risk of displacement. Pt aware.)  intravenous induction     Anesthetic plan, risks, benefits, and alternatives have been provided, discussed and informed consent has been obtained with: patient.    Plan discussed with CRNA.        CODE STATUS:

## 2025-02-12 NOTE — DISCHARGE INSTRUCTIONS
A responsible adult should stay with you and you should rest quietly for the rest of the day.    Do not drink alcohol, drive, operate any heavy machinery or power tools or make any legal/important decisions for the next 24 hours.     Progress your diet as tolerated.  If you begin to experience severe pain, increased shortness of breath, racing heartbeat or a fever above 101 F, seek immediate medical attention.     Follow up with MD as instructed. Call office for results in 3 to 5 days if needed.     Dr Jiang @ [phone]      Recommendation:      Repeat colonoscopy in 3-5 years      Conclusion:  The screening colonoscopy was completed successfully, and the entire colon was visualized without any complications. The patient tolerated the procedure well and was transferred to the recovery area in stable condition. Post-procedure instructions and follow-up were discussed with the patient and will be provided in written form.     Len Jiang MD  Colon and Rectal Surgery   [facility]   [address] [facility] [place], [address]  T: [phone]

## 2025-02-14 LAB
LAB AP CASE REPORT: NORMAL
PATH REPORT.FINAL DX SPEC: NORMAL
PATH REPORT.GROSS SPEC: NORMAL

## 2025-03-05 RX ORDER — GABAPENTIN 400 MG/1
CAPSULE ORAL
Qty: 270 CAPSULE | Refills: 1 | Status: SHIPPED | OUTPATIENT
Start: 2025-03-05

## 2025-04-07 RX ORDER — SITAGLIPTIN AND METFORMIN HYDROCHLORIDE 1000; 50 MG/1; MG/1
1 TABLET, FILM COATED, EXTENDED RELEASE ORAL EVERY 12 HOURS SCHEDULED
Qty: 180 TABLET | Refills: 1 | Status: SHIPPED | OUTPATIENT
Start: 2025-04-07

## 2025-07-10 RX ORDER — ISOSORBIDE MONONITRATE 60 MG/1
60 TABLET, EXTENDED RELEASE ORAL DAILY
Qty: 90 TABLET | Refills: 0 | Status: SHIPPED | OUTPATIENT
Start: 2025-07-10

## 2025-07-15 ENCOUNTER — LAB (OUTPATIENT)
Dept: LAB | Facility: HOSPITAL | Age: 70
End: 2025-07-15
Payer: MEDICARE

## 2025-07-15 ENCOUNTER — TRANSCRIBE ORDERS (OUTPATIENT)
Dept: ADMINISTRATIVE | Facility: HOSPITAL | Age: 70
End: 2025-07-15
Payer: MEDICARE

## 2025-07-15 DIAGNOSIS — E87.5 HYPERPOTASSEMIA: ICD-10-CM

## 2025-07-15 DIAGNOSIS — E87.5 HYPERPOTASSEMIA: Primary | ICD-10-CM

## 2025-07-15 LAB
ANION GAP SERPL CALCULATED.3IONS-SCNC: 8.4 MMOL/L (ref 5–15)
BUN SERPL-MCNC: 24 MG/DL (ref 8–23)
BUN/CREAT SERPL: 15 (ref 7–25)
CALCIUM SPEC-SCNC: 9.7 MG/DL (ref 8.6–10.5)
CHLORIDE SERPL-SCNC: 103 MMOL/L (ref 98–107)
CO2 SERPL-SCNC: 24.6 MMOL/L (ref 22–29)
CREAT SERPL-MCNC: 1.6 MG/DL (ref 0.57–1)
EGFRCR SERPLBLD CKD-EPI 2021: 34.8 ML/MIN/1.73
GLUCOSE SERPL-MCNC: 165 MG/DL (ref 65–99)
POTASSIUM SERPL-SCNC: 5.1 MMOL/L (ref 3.5–5.2)
SODIUM SERPL-SCNC: 136 MMOL/L (ref 136–145)

## 2025-07-15 PROCEDURE — 36415 COLL VENOUS BLD VENIPUNCTURE: CPT

## 2025-07-15 PROCEDURE — 80048 BASIC METABOLIC PNL TOTAL CA: CPT

## 2025-08-06 ENCOUNTER — OFFICE VISIT (OUTPATIENT)
Dept: FAMILY MEDICINE CLINIC | Facility: CLINIC | Age: 70
End: 2025-08-06
Payer: MEDICARE

## 2025-08-06 VITALS
OXYGEN SATURATION: 93 % | HEART RATE: 70 BPM | DIASTOLIC BLOOD PRESSURE: 62 MMHG | WEIGHT: 161.6 LBS | HEIGHT: 64 IN | SYSTOLIC BLOOD PRESSURE: 104 MMHG | BODY MASS INDEX: 27.59 KG/M2

## 2025-08-06 DIAGNOSIS — R30.9 PAINFUL URINATION: ICD-10-CM

## 2025-08-06 DIAGNOSIS — N30.00 ACUTE CYSTITIS WITHOUT HEMATURIA: Primary | ICD-10-CM

## 2025-08-06 LAB
BILIRUB BLD-MCNC: NEGATIVE MG/DL
CLARITY, POC: ABNORMAL
COLOR UR: YELLOW
EXPIRATION DATE: ABNORMAL
GLUCOSE UR STRIP-MCNC: ABNORMAL MG/DL
KETONES UR QL: NEGATIVE
LEUKOCYTE EST, POC: NEGATIVE
Lab: ABNORMAL
NITRITE UR-MCNC: NEGATIVE MG/ML
PH UR: 5.5 [PH] (ref 5–8)
PROT UR STRIP-MCNC: ABNORMAL MG/DL
RBC # UR STRIP: ABNORMAL /UL
SP GR UR: 1.02 (ref 1–1.03)
UROBILINOGEN UR QL: NORMAL

## 2025-08-06 PROCEDURE — 87086 URINE CULTURE/COLONY COUNT: CPT | Performed by: FAMILY MEDICINE

## 2025-08-06 RX ORDER — AMOXICILLIN 500 MG/1
500 CAPSULE ORAL 2 TIMES DAILY
Qty: 14 CAPSULE | Refills: 0 | Status: SHIPPED | OUTPATIENT
Start: 2025-08-06

## 2025-08-07 LAB — BACTERIA SPEC AEROBE CULT: NO GROWTH

## (undated) DEVICE — TRAP WIDEEYE POLYP

## (undated) DEVICE — PK ENDO GI 50

## (undated) DEVICE — SNAR POLYP HOTSNARE/BRAIDED OVL/MINI 7F 2.8X10MM 230CM 1P/U